# Patient Record
Sex: FEMALE | Race: WHITE | Employment: UNEMPLOYED | URBAN - METROPOLITAN AREA
[De-identification: names, ages, dates, MRNs, and addresses within clinical notes are randomized per-mention and may not be internally consistent; named-entity substitution may affect disease eponyms.]

---

## 2018-02-12 ENCOUNTER — HOSPITAL ENCOUNTER (EMERGENCY)
Age: 58
Discharge: HOME | End: 2018-02-12
Attending: EMERGENCY MEDICINE | Admitting: EMERGENCY MEDICINE
Payer: SUBSIDIZED

## 2018-02-12 ENCOUNTER — APPOINTMENT (OUTPATIENT)
Dept: GENERAL RADIOLOGY | Age: 58
End: 2018-02-12
Attending: EMERGENCY MEDICINE
Payer: SUBSIDIZED

## 2018-02-12 VITALS
OXYGEN SATURATION: 99 % | BODY MASS INDEX: 23.92 KG/M2 | DIASTOLIC BLOOD PRESSURE: 75 MMHG | HEIGHT: 62 IN | SYSTOLIC BLOOD PRESSURE: 113 MMHG | RESPIRATION RATE: 16 BRPM | TEMPERATURE: 98.4 F | WEIGHT: 130 LBS | HEART RATE: 84 BPM

## 2018-02-12 DIAGNOSIS — R07.9 CHEST PAIN, UNSPECIFIED TYPE: Primary | ICD-10-CM

## 2018-02-12 DIAGNOSIS — G89.29 CHRONIC NONINTRACTABLE HEADACHE, UNSPECIFIED HEADACHE TYPE: ICD-10-CM

## 2018-02-12 DIAGNOSIS — R51.9 CHRONIC NONINTRACTABLE HEADACHE, UNSPECIFIED HEADACHE TYPE: ICD-10-CM

## 2018-02-12 LAB
ALBUMIN SERPL-MCNC: 4.1 G/DL (ref 3.5–5)
ALBUMIN/GLOB SERPL: 1 {RATIO} (ref 1.1–2.2)
ALP SERPL-CCNC: 86 U/L (ref 45–117)
ALT SERPL-CCNC: 26 U/L (ref 12–78)
ANION GAP SERPL CALC-SCNC: 6 MMOL/L (ref 5–15)
AST SERPL-CCNC: 22 U/L (ref 15–37)
ATRIAL RATE: 77 BPM
BASOPHILS # BLD: 0 K/UL (ref 0–0.1)
BASOPHILS NFR BLD: 0 % (ref 0–1)
BILIRUB SERPL-MCNC: 0.2 MG/DL (ref 0.2–1)
BUN SERPL-MCNC: 13 MG/DL (ref 6–20)
BUN/CREAT SERPL: 22 (ref 12–20)
CALCIUM SERPL-MCNC: 8.9 MG/DL (ref 8.5–10.1)
CALCULATED P AXIS, ECG09: 51 DEGREES
CALCULATED R AXIS, ECG10: 60 DEGREES
CALCULATED T AXIS, ECG11: 60 DEGREES
CHLORIDE SERPL-SCNC: 105 MMOL/L (ref 97–108)
CO2 SERPL-SCNC: 29 MMOL/L (ref 21–32)
CREAT SERPL-MCNC: 0.6 MG/DL (ref 0.55–1.02)
DIAGNOSIS, 93000: NORMAL
DIFFERENTIAL METHOD BLD: NORMAL
EOSINOPHIL # BLD: 0 K/UL (ref 0–0.4)
EOSINOPHIL NFR BLD: 1 % (ref 0–7)
ERYTHROCYTE [DISTWIDTH] IN BLOOD BY AUTOMATED COUNT: 12.2 % (ref 11.5–14.5)
GLOBULIN SER CALC-MCNC: 4.1 G/DL (ref 2–4)
GLUCOSE SERPL-MCNC: 94 MG/DL (ref 65–100)
HCT VFR BLD AUTO: 37.3 % (ref 35–47)
HGB BLD-MCNC: 12.8 G/DL (ref 11.5–16)
IMM GRANULOCYTES # BLD: 0 K/UL (ref 0–0.04)
IMM GRANULOCYTES NFR BLD AUTO: 0 % (ref 0–0.5)
LYMPHOCYTES # BLD: 2 K/UL (ref 0.8–3.5)
LYMPHOCYTES NFR BLD: 43 % (ref 12–49)
MCH RBC QN AUTO: 30.5 PG (ref 26–34)
MCHC RBC AUTO-ENTMCNC: 34.3 G/DL (ref 30–36.5)
MCV RBC AUTO: 88.8 FL (ref 80–99)
MONOCYTES # BLD: 0.6 K/UL (ref 0–1)
MONOCYTES NFR BLD: 12 % (ref 5–13)
NEUTS SEG # BLD: 2 K/UL (ref 1.8–8)
NEUTS SEG NFR BLD: 44 % (ref 32–75)
NRBC # BLD: 0 K/UL (ref 0–0.01)
NRBC BLD-RTO: 0 PER 100 WBC
P-R INTERVAL, ECG05: 122 MS
PLATELET # BLD AUTO: 306 K/UL (ref 150–400)
PMV BLD AUTO: 9 FL (ref 8.9–12.9)
POTASSIUM SERPL-SCNC: 4.3 MMOL/L (ref 3.5–5.1)
PROT SERPL-MCNC: 8.2 G/DL (ref 6.4–8.2)
Q-T INTERVAL, ECG07: 372 MS
QRS DURATION, ECG06: 86 MS
QTC CALCULATION (BEZET), ECG08: 420 MS
RBC # BLD AUTO: 4.2 M/UL (ref 3.8–5.2)
SODIUM SERPL-SCNC: 140 MMOL/L (ref 136–145)
TROPONIN I SERPL-MCNC: <0.04 NG/ML
VENTRICULAR RATE, ECG03: 77 BPM
WBC # BLD AUTO: 4.7 K/UL (ref 3.6–11)

## 2018-02-12 PROCEDURE — 36415 COLL VENOUS BLD VENIPUNCTURE: CPT | Performed by: EMERGENCY MEDICINE

## 2018-02-12 PROCEDURE — 99283 EMERGENCY DEPT VISIT LOW MDM: CPT

## 2018-02-12 PROCEDURE — 71046 X-RAY EXAM CHEST 2 VIEWS: CPT

## 2018-02-12 PROCEDURE — 93005 ELECTROCARDIOGRAM TRACING: CPT

## 2018-02-12 PROCEDURE — 80053 COMPREHEN METABOLIC PANEL: CPT | Performed by: EMERGENCY MEDICINE

## 2018-02-12 PROCEDURE — 85025 COMPLETE CBC W/AUTO DIFF WBC: CPT | Performed by: EMERGENCY MEDICINE

## 2018-02-12 PROCEDURE — 84484 ASSAY OF TROPONIN QUANT: CPT | Performed by: EMERGENCY MEDICINE

## 2018-02-12 RX ORDER — LOVASTATIN 20 MG/1
20 TABLET ORAL
COMMUNITY
End: 2019-09-06

## 2018-02-12 RX ORDER — TRAZODONE HYDROCHLORIDE 50 MG/1
50 TABLET ORAL
COMMUNITY
End: 2019-09-06

## 2018-02-12 RX ORDER — DICLOFENAC SODIUM 50 MG/1
50 TABLET, DELAYED RELEASE ORAL 2 TIMES DAILY
Qty: 20 TAB | Refills: 0 | Status: SHIPPED | OUTPATIENT
Start: 2018-02-12 | End: 2019-12-06 | Stop reason: ALTCHOICE

## 2018-02-12 NOTE — ED PROVIDER NOTES
HPI Comments: 56 y.o. female with past medical history significant for high cholesterol who presents from home with chief complaint of CP. The pt reports that she has chronic R sided HA that is associated chronic intermittent R to mid CP that has persisted for years. The pt reports that she has the CP about 5 days ago and that it can last for days at a time. The pt also has R arm numbness with her CP. The pt has been taking Ibuprofen for pain. The pt had a fever 2 days ago. The pt started to have a cough one day ago. The pt has had a prior evaluation for her sx in her home country. The pt denies abdominal pain, SOB, and vomiting. There are no other acute medical concerns at this time. Social hx: nonsmoker, no EtOH use  PCP: None    Note written by Johnson Benitez, as dictated by Maria Alejandra Hobson MD 4:08 PM      The history is provided by the patient. A  was used (son). History reviewed. No pertinent past medical history. History reviewed. No pertinent surgical history. History reviewed. No pertinent family history. Social History     Social History    Marital status:      Spouse name: N/A    Number of children: N/A    Years of education: N/A     Occupational History    Not on file. Social History Main Topics    Smoking status: Never Smoker    Smokeless tobacco: Not on file    Alcohol use No    Drug use: Not on file    Sexual activity: Not on file     Other Topics Concern    Not on file     Social History Narrative    No narrative on file         ALLERGIES: Review of patient's allergies indicates no known allergies. Review of Systems   Constitutional: Positive for fever. Eyes: Negative for visual disturbance. Respiratory: Positive for cough. Negative for shortness of breath and wheezing. Cardiovascular: Positive for chest pain. Negative for leg swelling. Gastrointestinal: Negative for abdominal pain, diarrhea, nausea and vomiting. Genitourinary: Negative for dysuria. Musculoskeletal: Negative. Negative for back pain and neck stiffness. Skin: Negative for rash. Neurological: Positive for numbness and headaches. Negative for syncope. Psychiatric/Behavioral: Negative for confusion. All other systems reviewed and are negative. Vitals:    02/12/18 1451   BP: 118/76   Pulse: 76   Resp: 16   Temp: 98.4 °F (36.9 °C)   SpO2: 100%   Weight: 59 kg (130 lb)   Height: 5' 2\" (1.575 m)            Physical Exam   Constitutional: She appears well-developed and well-nourished. No distress. HENT:   Head: Normocephalic. Eyes: Pupils are equal, round, and reactive to light. Neck: Normal range of motion. Cardiovascular: Normal rate, regular rhythm and normal heart sounds. No murmur heard. Pulmonary/Chest: Effort normal and breath sounds normal. No respiratory distress. Occasional dry cough; Abdominal: Soft. There is no tenderness. Musculoskeletal: Normal range of motion. She exhibits no edema. Neurological: She is alert. She has normal strength. No cranial nerve deficit. Skin: Skin is warm and dry. Psychiatric: She has a normal mood and affect. Her behavior is normal.   Nursing note and vitals reviewed. Note written by Johnson Larios, as dictated by Mary Sanchez MD 4:09 PM      [facility]      ED Course       Procedures  ED EKG interpretation:  Rhythm: normal sinus rhythm; and regular . Rate (approx.): 77; Axis: normal; ST/T wave: normal;   Note written by Johnson Larios, as dictated by Mary Sanchez MD 4:09 PM    Discussed results c pt's son - who is . Advised to use voltaren prn and f./u c st. Marily [place] f.m. In 3 days if no better.

## 2018-02-13 NOTE — DISCHARGE INSTRUCTIONS
Dolor de pecho: Instrucciones de cuidado - [ Chest Pain: Care Instructions ]  Instrucciones de cuidado    El dolor de pecho puede tener muchas causas. Algunas no son graves y mejorarán por sí solas en pocos días. Rodney algunos tipos de dolor de pecho requieren más pruebas y [place]. Es posible que hinojosa médico le haya recomendado nickie visita de seguimiento dentro de las 8 a 12 horas siguientes. Si no mejora, es posible que necesite [address] pruebas o [place]. Aunque hinojosa médico le haya dado de dary, es necesario que esté atento a cualquier problema que se presente. El médico le hizo un cuidadoso chequeo, rodney a veces los problemas pueden aparecer posteriormente. Si tiene nuevos síntomas o éstos no mejoran, obtenga atención médica de inmediato. Si tiene dolor o presión en el pecho que empeora o es diferente y que dura más de 5 minutos, o se desmayó (perdió el conocimiento), llame al 911 o busque otra ayuda de emergencia de inmediato. Acudir a nickie consulta médica es sólo un paso en hinojosa tratamiento. Aunque se sienta mejor, todavía deberá hacer lo que hinojosa médico le recomiende, ralph asistir a todas las visitas de seguimiento sugeridas y yaquelin los medicamentos exactamente [employer]	[place] fueron indicados. [place] le ayudará a recuperarse y prevenir problemas futuros. ¿Cómo puede cuidarse en el hogar? · Descanse hasta que se sienta mejor. · Kerby arthur medicamentos exactamente ralph le fueron recetados. Llame a hinojosa médico si miguelito estar teniendo problemas con hinojosa medicamento. · No conduzca después de yaquelin un analgésico (medicamento para el dolor) recetado. ¿Cuándo debe pedir ayuda? Llame al 911 si:  ? · Se desmayó (perdió el conocimiento). ? · Tiene graves dificultades para respirar. ? · Tiene síntomas de un ataque al corazón. Estos podrían incluir:  ¨ Dolor o presión en el pecho, o nickie sensación extraña en el pecho. ¨ Sudoración. ¨ Falta de aire. ¨ Náuseas o vómito.   ¨ Dolor, presión o nickie sensación extraña en la espalda, el bacilio, la mandíbula, la parte superior del abdomen o en samy o ambos hombros o brazos. ¨ Aturdimiento o debilidad repentina. ¨ Latidos del corazón rápidos o irregulares. Después de llamar al 911, es posible que el operador le diga que mastique 1 aspirina para adultos o de 2 a 4 aspirinas de dosis baja. Espere a nickie ambulancia. No intente conducir usted mismo. ? Llame hoy a hinojosa médico si:  ? · Tiene cualquier dificultad para respirar. ? · El dolor en el pecho empeora. ? · [employer] o aturdimiento, o que está a punto de [place]. ? · No mejora ralph se esperaba. ? · Tiene dolor de pecho nuevo o diferente. ¿Dónde puede encontrar más información en inglés? [place] a [email or web address]://[email or web address].info/. Escriba A120 en la búsqueda para aprender más acerca de \"Dolor de pecho: Instrucciones de cuidado - [ Chest Pain: Care Instructions ]. \"  Revisado: 20 Marce Cr 2017  Versión del contenido: 11.4  © 6075-3453 Healthwise, Incorporated. Las instrucciones de cuidado fueron adaptadas bajo licencia por [employer] (which disclaims liability or warranty for this information). Si usted tiene [place] afección médica o sobre estas instrucciones, siempre pregunte a hinojosa profesional de danielle. Healthwise, Incorporated niega toda garantía o responsabilidad por hinjoosa uso de esta información. Dolor de rayo: Instrucciones de cuidado - [ Headache: Care Instructions ]  Instrucciones de cuidado    Los serafin de rayo tienen muchas causas posibles. La mayoría de los serafin de rayo no son señal de un problema más lola y mejoran por sí solos. El tratamiento en el hogar podría ayudarlo a sentirse mejor con [place]. El médico lo marquis revisado minuciosamente, yasir puede desarrollar problemas más tarde. Si nota algún problema o síntomas, busque tratamiento médico inmediatamente. La atención de seguimiento es nickie parte clave de hinojosa tratamiento y seguridad.  Asegúrese de hacer y acudir a todas las citas, y llame a hinojosa médico si está teniendo problemas. También es nickie buena idea saber los resultados de los exámenes y mantener nickie lista de los medicamentos que sherrie. ¿Cómo puede cuidarse en el hogar? · No conduzca si ha tomado analgésicos (medicamentos para el dolor) recetados. · Descanse en un cuarto tranquilo y oscuro hasta que desaparezca el dolor de [place]. Cierre los ojos y trate de relajarse o dormirse. No aissatou la televisión ni gerri. · Colóquese un paño frío y húmedo o [facility] compresa fría sobre la van adolorida de 10 a 21 minutos cada vez. Póngase un paño maldonado entre la compresa fría y la piel. · Utilice nickie toalla húmeda tibia o nickie almohadilla térmica ajustada a baja temperatura para relajar los músculos tensos del bacilio y los hombros.  · [place] a alguien que le isidro masajes suaves en el bacilio y los hombros.  · [place] los analgésicos exactamente ralph le fueron indicados. ¨ Si el médico le recetó un analgésico, tómelo según las indicaciones. ¨ Si no está tomando un analgésico recetado, pregúntele a hinojosa médico si puede yaquelin samy de [employer]. · Tenga cuidado de no yaquelin analgésicos con mayor frecuencia que la permitida en las indicaciones porque los serafin de rayo podrían empeorar o aparecer con mayor frecuencia nickie vez que el medicamento pierda hinojosa [place]. · Preste atención a los nuevos síntomas que aparecen con el dolor de [place], [place], debilidad o entumecimiento, cambios en la visión o confusión. Podrían ser señales de un problema más grave. Para prevenir los serafin de rayo  · [employer] un diario de arthur serafin de rayo para que pueda averiguar qué los desencadena. Evitar los desencadenantes podría ayudar a prevenir los serafin de [place]. Anote cuándo empieza cada dolor de [place], cuánto dura y cómo es el dolor (palpitante, le, punzante o sordo).  Anote cualquier otro síntoma que haya tenido con el dolor de [place], [place] náuseas, destellos de michael o MAANINKA oscuras, o sensibilidad a la michael brillante o a los ruidos filomena. Anote si el dolor de rayo ocurrió cerca de hinojosa menstruación. Enumere todos los factores que pudieran scott desencadenado el dolor de [place], ralph ciertos alimentos (chocolate, queso, vino) u olores, humo, luces brillantes, estrés o falta de sueño. · Encuentre maneras saludables de [facility]. Los serafin de [place] son más comunes heidy o des después de un momento estresante. Tómese un tiempo para relajarse antes y después de hacer algo que le haya causado un dolor de rayo en el pasado. · Trate de mantener chela músculos relajados mediante nickie buena postura. Revise si tiene [employer] de la Bhak[place], la lyndsey, el bacilio y los hombros y trate de relajarlos. Cuando se siente en un escritorio, cambie de posición con frecuencia y estírese por 27 segundos cada hora. · Aki suficiente ejercicio y duerma bastante. · Coma en forma regular y yoni. Largos períodos sin comer pueden provocar un dolor de rayo. · Regálese un masaje. Algunas personas encuentran que los masajes hechos con regularidad son [place] para aliviar la tensión. · Limite la cafeína. No karma demasiado café, té ni sodas. Rodney no deje de consumir cafeína de repente, porque eso también puede provocarle serafin de [place]. · Reduzca la tensión en los ojos a causa de la computadora parpadeando con frecuencia y apartando la mirada de la pantalla a menudo. Asegúrese de tener lentes adecuados y de que hinojosa monitor esté colocado de manera correcta, ralph a un brazo de distancia. · Busque ayuda si tiene depresión o ansiedad. Chela serafin de [place] podrían relacionarse con estas afecciones. El tratamiento puede prevenir los serafin de [place] y ayudar con los síntomas de ansiedad o depresión. ¿Cuándo debe pedir ayuda? Llame al 911 en cualquier momento que piense que puede necesitar atención de emergencia. Por ejemplo, llame si:  ?  · Tiene señales de un ataque cerebral. Estas pueden incluir:  ¨ Parálisis, entumecimiento o debilidad repentinos en la lyndsey, el brazo o la pierna, sobre todo si ocurre en un solo lado del cuerpo. ¨ Cambios repentinos en la visión. ¨ Dificultades repentinas para hablar. ¨ Confusión repentina o dificultad para comprender frases sencillas. ¨ Problemas repentinos para caminar o mantener el equilibrio. ¨ Dolor de [place] intenso y repentino, distinto de los serafin de rayo anteriores. ?Llame a hinojosa médico ahora mismo o busque atención médica inmediata si:  ? · Tiene un dolor de rayo nuevo o peor. ? · Hinojosa dolor de rayo [employer]. ¿Dónde puede encontrar más información en inglés? Renata Segundo a [email or web address]-[email or web address].info/. Escriba H942 en la búsqueda para aprender más acerca de \"Dolor de rayo: Instrucciones de cuidado - [ Headache: Care Instructions ]. \"  Revisado: 14 octubre, 2016  Versión del contenido: 11.4  © 2263-5786 Healthwise, Incorporated. Las instrucciones de cuidado fueron adaptadas bajo licencia por [employer] (which disclaims liability or warranty for this information). Si usted tiene [place] afección médica o sobre estas instrucciones, siempre pregunte a hinojosa profesional de danielle. Healthwise, Incorporated niega toda garantía o responsabilidad por hinojosa uso de esta información.

## 2019-02-21 ENCOUNTER — APPOINTMENT (OUTPATIENT)
Dept: GENERAL RADIOLOGY | Age: 59
End: 2019-02-21
Attending: EMERGENCY MEDICINE
Payer: SUBSIDIZED

## 2019-02-21 ENCOUNTER — APPOINTMENT (OUTPATIENT)
Dept: CT IMAGING | Age: 59
End: 2019-02-21
Attending: EMERGENCY MEDICINE
Payer: SUBSIDIZED

## 2019-02-21 ENCOUNTER — HOSPITAL ENCOUNTER (EMERGENCY)
Age: 59
Discharge: HOME HEALTH | End: 2019-02-21
Attending: EMERGENCY MEDICINE
Payer: SUBSIDIZED

## 2019-02-21 VITALS
BODY MASS INDEX: 24.29 KG/M2 | HEIGHT: 62 IN | TEMPERATURE: 98.7 F | RESPIRATION RATE: 21 BRPM | SYSTOLIC BLOOD PRESSURE: 120 MMHG | DIASTOLIC BLOOD PRESSURE: 69 MMHG | HEART RATE: 85 BPM | WEIGHT: 132 LBS | OXYGEN SATURATION: 99 %

## 2019-02-21 DIAGNOSIS — R20.2 PARESTHESIA: ICD-10-CM

## 2019-02-21 DIAGNOSIS — R51.9 ACUTE NONINTRACTABLE HEADACHE, UNSPECIFIED HEADACHE TYPE: Primary | ICD-10-CM

## 2019-02-21 DIAGNOSIS — R06.02 SOB (SHORTNESS OF BREATH): ICD-10-CM

## 2019-02-21 LAB
ALBUMIN SERPL-MCNC: 3.9 G/DL (ref 3.5–5)
ALBUMIN/GLOB SERPL: 0.9 {RATIO} (ref 1.1–2.2)
ALP SERPL-CCNC: 71 U/L (ref 45–117)
ALT SERPL-CCNC: 52 U/L (ref 12–78)
ANION GAP SERPL CALC-SCNC: 15 MMOL/L (ref 5–15)
AST SERPL-CCNC: 39 U/L (ref 15–37)
ATRIAL RATE: 90 BPM
BASOPHILS # BLD: 0 K/UL (ref 0–0.1)
BASOPHILS NFR BLD: 0 % (ref 0–1)
BILIRUB SERPL-MCNC: 0.5 MG/DL (ref 0.2–1)
BUN SERPL-MCNC: 18 MG/DL (ref 6–20)
BUN/CREAT SERPL: 30 (ref 12–20)
CALCIUM SERPL-MCNC: 9.7 MG/DL (ref 8.5–10.1)
CALCULATED P AXIS, ECG09: 52 DEGREES
CALCULATED R AXIS, ECG10: 33 DEGREES
CALCULATED T AXIS, ECG11: 72 DEGREES
CHLORIDE SERPL-SCNC: 103 MMOL/L (ref 97–108)
CO2 SERPL-SCNC: 22 MMOL/L (ref 21–32)
COMMENT, HOLDF: NORMAL
CREAT SERPL-MCNC: 0.61 MG/DL (ref 0.55–1.02)
D DIMER PPP FEU-MCNC: <0.19 MG/L FEU (ref 0–0.65)
DIAGNOSIS, 93000: NORMAL
DIFFERENTIAL METHOD BLD: ABNORMAL
EOSINOPHIL # BLD: 0.1 K/UL (ref 0–0.4)
EOSINOPHIL NFR BLD: 2 % (ref 0–7)
ERYTHROCYTE [DISTWIDTH] IN BLOOD BY AUTOMATED COUNT: 12.1 % (ref 11.5–14.5)
GLOBULIN SER CALC-MCNC: 4.2 G/DL (ref 2–4)
GLUCOSE SERPL-MCNC: 95 MG/DL (ref 65–100)
HCT VFR BLD AUTO: 37.8 % (ref 35–47)
HGB BLD-MCNC: 13 G/DL (ref 11.5–16)
IMM GRANULOCYTES # BLD AUTO: 0 K/UL (ref 0–0.04)
IMM GRANULOCYTES NFR BLD AUTO: 0 % (ref 0–0.5)
LYMPHOCYTES # BLD: 3.4 K/UL (ref 0.8–3.5)
LYMPHOCYTES NFR BLD: 48 % (ref 12–49)
MCH RBC QN AUTO: 30.2 PG (ref 26–34)
MCHC RBC AUTO-ENTMCNC: 34.4 G/DL (ref 30–36.5)
MCV RBC AUTO: 87.7 FL (ref 80–99)
MONOCYTES # BLD: 0.6 K/UL (ref 0–1)
MONOCYTES NFR BLD: 8 % (ref 5–13)
NEUTS SEG # BLD: 2.9 K/UL (ref 1.8–8)
NEUTS SEG NFR BLD: 41 % (ref 32–75)
NRBC # BLD: 0 K/UL (ref 0–0.01)
NRBC BLD-RTO: 0 PER 100 WBC
P-R INTERVAL, ECG05: 164 MS
PLATELET # BLD AUTO: 375 K/UL (ref 150–400)
PMV BLD AUTO: 8.8 FL (ref 8.9–12.9)
POTASSIUM SERPL-SCNC: 4.2 MMOL/L (ref 3.5–5.1)
PROT SERPL-MCNC: 8.1 G/DL (ref 6.4–8.2)
Q-T INTERVAL, ECG07: 368 MS
QRS DURATION, ECG06: 72 MS
QTC CALCULATION (BEZET), ECG08: 450 MS
RBC # BLD AUTO: 4.31 M/UL (ref 3.8–5.2)
SAMPLES BEING HELD,HOLD: NORMAL
SODIUM SERPL-SCNC: 140 MMOL/L (ref 136–145)
TROPONIN I SERPL-MCNC: <0.05 NG/ML
VENTRICULAR RATE, ECG03: 90 BPM
WBC # BLD AUTO: 7.1 K/UL (ref 3.6–11)

## 2019-02-21 PROCEDURE — 99284 EMERGENCY DEPT VISIT MOD MDM: CPT

## 2019-02-21 PROCEDURE — 85379 FIBRIN DEGRADATION QUANT: CPT

## 2019-02-21 PROCEDURE — 80053 COMPREHEN METABOLIC PANEL: CPT

## 2019-02-21 PROCEDURE — 96374 THER/PROPH/DIAG INJ IV PUSH: CPT

## 2019-02-21 PROCEDURE — 71046 X-RAY EXAM CHEST 2 VIEWS: CPT

## 2019-02-21 PROCEDURE — 84484 ASSAY OF TROPONIN QUANT: CPT

## 2019-02-21 PROCEDURE — 74011000250 HC RX REV CODE- 250: Performed by: EMERGENCY MEDICINE

## 2019-02-21 PROCEDURE — 96375 TX/PRO/DX INJ NEW DRUG ADDON: CPT

## 2019-02-21 PROCEDURE — 36415 COLL VENOUS BLD VENIPUNCTURE: CPT

## 2019-02-21 PROCEDURE — 74011250636 HC RX REV CODE- 250/636: Performed by: EMERGENCY MEDICINE

## 2019-02-21 PROCEDURE — 70450 CT HEAD/BRAIN W/O DYE: CPT

## 2019-02-21 PROCEDURE — 93005 ELECTROCARDIOGRAM TRACING: CPT

## 2019-02-21 PROCEDURE — 85025 COMPLETE CBC W/AUTO DIFF WBC: CPT

## 2019-02-21 RX ORDER — DIPHENHYDRAMINE HYDROCHLORIDE 50 MG/ML
25 INJECTION, SOLUTION INTRAMUSCULAR; INTRAVENOUS
Status: COMPLETED | OUTPATIENT
Start: 2019-02-21 | End: 2019-02-21

## 2019-02-21 RX ORDER — ONDANSETRON 4 MG/1
4 TABLET, ORALLY DISINTEGRATING ORAL
Qty: 12 TAB | Refills: 0 | Status: SHIPPED | OUTPATIENT
Start: 2019-02-21 | End: 2019-12-06 | Stop reason: ALTCHOICE

## 2019-02-21 RX ADMIN — DIPHENHYDRAMINE HYDROCHLORIDE 25 MG: 50 INJECTION, SOLUTION INTRAMUSCULAR; INTRAVENOUS at 05:31

## 2019-02-21 RX ADMIN — SODIUM CHLORIDE 10 MG: 9 INJECTION INTRAMUSCULAR; INTRAVENOUS; SUBCUTANEOUS at 05:31

## 2019-02-21 NOTE — DISCHARGE INSTRUCTIONS
Patient Education        Dolor de [place]: Instrucciones de cuidado - [ Headache: Care Instructions ]  Instrucciones de cuidado    Los serafin de rayo tienen muchas causas posibles. La mayoría de los serafin de rayo no son señal de un problema más lola y mejoran por sí solos. El tratamiento en el hogar podría ayudarlo a sentirse mejor con Rakan Heal. El médico lo marquis revisado minuciosamente, yasir puede desarrollar problemas más tarde. Si nota algún problema o síntomas, busque tratamiento médico inmediatamente. La atención de seguimiento es nickie parte clave de hinojosa tratamiento y seguridad. Asegúrese de hacer y acudir a todas las citas, y llame a hinojosa médico si está teniendo problemas. También es nickie buena idea saber los resultados de arthur exámenes y mantener nickie lista de los medicamentos que sherrie. ¿Cómo puede cuidarse en el hogar? · No conduzca si ha tomado analgésicos (medicamentos para el dolor) recetados. · Descanse en un cuarto tranquilo y oscuro hasta que desaparezca el dolor de [place]. Cierre los ojos y trate de relajarse o dormirse. No aissatou la televisión ni gerri. · Colóquese un paño frío y húmedo o [facility] compresa fría sobre la van adolorida de 10 a 21 minutos cada vez. Póngase un paño maldonado entre la compresa fría y la piel. · Utilice nickie toalla húmeda tibia o nickie almohadilla térmica ajustada a baja temperatura para relajar los músculos tensos del bacilio y los hombros.  · [place]dale a alguien que le isidro masajes suaves en el bacilio y los hombros.  · [place] los analgésicos exactamente ralph le fueron indicados. ? Si el médico le recetó un analgésico, tómelo según las indicaciones. ? Si no está tomando un analgésico recetado, pregúntele a hinojosa médico si puede yaquelin samy de [employer]. · Tenga cuidado de no yaquelin analgésicos con mayor frecuencia que la permitida en las indicaciones porque los serafin de rayo podrían empeorar o aparecer con mayor frecuencia nickie vez que el medicamento pierda hinojosa [place].   · Preste atención a los [employer] que aparecen con el dolor de [place], [place], debilidad o entumecimiento, cambios en la visión o confusión. Podrían ser señales de un problema más grave. Para prevenir los serafin de rayo  · [employer] un diario de arthur serafin de rayo para que pueda averiguar qué los desencadena. Evitar los desencadenantes podría ayudar a prevenir los serafin de [place]. Anote cuándo empieza cada dolor de [place], cuánto dura y cómo es el dolor (palpitante, le, punzante o sordo). Anote cualquier otro síntoma que haya tenido con el dolor de [place], [place] náuseas, destellos de michael o MARIAJOSE, o sensibilidad a la michael brillante o a los ruidos filomena. Anote si el dolor de rayo ocurrió cerca de hinojosa menstruación. Enumere todos los factores que pudieran scott desencadenado el dolor de [place], ralph ciertos alimentos (chocolate, queso, vino) u olores, humo, luces brillantes, estrés o falta de sueño. · Encuentre maneras saludables de [facility]. Los serafin de [place] son más comunes heidy o des después de un momento estresante. Tómese un tiempo para relajarse antes y después de hacer algo que le haya causado un dolor de rayo en el pasado. · Trate de mantener arthur músculos relajados mediante nickie buena postura. Revise si tiene [employer] de la Bhakti, la lyndsey, el bacilio y los hombros y trate de relajarlos. Cuando se siente en un escritorio, cambie de posición con frecuencia y estírese por 27 segundos cada hora. · Aki suficiente ejercicio y duerma bastante. · Coma en forma regular y yoni. Largos períodos sin comer pueden provocar un dolor de rayo. · Regálese un masaje. Algunas personas encuentran que los masajes hechos con regularidad son Mariellen Contras para aliviar la tensión. · Limite la cafeína. No karma demasiado café, té ni sodas. Rodney no deje de consumir cafeína de repente, porque eso también puede provocarle serafin de [place].   · Reduzca la tensión en los ojos a causa de la computadora parpadeando con frecuencia y apartando la mirada de la pantalla a menudo. Asegúrese de tener lentes adecuados y de que hinojosa monitor esté colocado de manera correcta, ralph a un brazo de distancia. · Busque ayuda si tiene depresión o ansiedad. Chela serafin de [place] podrían relacionarse con estas afecciones. El tratamiento puede prevenir los serafin de [place] y ayudar con los síntomas de ansiedad o depresión. ¿Cuándo debe pedir ayuda? Llame al 911 en cualquier momento que piense que puede necesitar atención de emergencia. Por ejemplo, llame si:    · Tiene señales de un ataque cerebral. Estas pueden incluir:  ? Parálisis, entumecimiento o debilidad repentinos en la lyndsey, el brazo o la pierna, sobre todo si ocurre en un solo lado del cuerpo. ? Cambios repentinos en la visión. ? Dificultades repentinas para hablar. ? Confusión repentina o dificultad para comprender frases sencillas. ? Problemas repentinos para caminar o mantener el equilibrio. ? Dolor de [place] intenso y repentino, distinto de los serafin de rayo anteriores.    Llame a hinojosa médico ahora mismo o busque atención médica inmediata si:    · Tiene un dolor de [place] Sumit o peor.     · Hinojosa dolor de rayo empeora mucho. ¿Dónde puede encontrar más información en inglés? Nadeem christopher http://jeison-cathy.info/. Escriba R698 en la búsqueda para aprender más acerca de \"Dolor de rayo: Instrucciones de cuidado - [ Headache: Care Instructions ]. \"  Revisado: 3 junio, 2018  Versión del contenido: 11.9  © 8848-6640 Healthwise, Incorporated. Las instrucciones de cuidado fueron adaptadas bajo licencia por Good Help Connections (which disclaims liability or warranty for this information). Si usted tiene [place] afección médica o sobre estas instrucciones, siempre pregunte a hinojosa profesional de danielle. Healthwise, Incorporated niega toda garantía o responsabilidad por hinojosa uso de esta información.          Patient Education        Entumecimiento y hormigueo: Instrucciones de cuidado - [ Numbness and Tingling: Care Instructions ]  Instrucciones de cuidado    Muchas cosas pueden causar entumecimiento u hormigueo. Nickie hinchazón puede ejercer presión [employer]. [place] podría causarle pérdida de sensación o que tenga nickie sensación de hormigueo en parte de hinojosa cuerpo. Los nervios pueden ser dañados por traumatismos, toxinas o enfermedades ralph diabetes o esclerosis múltiple (MS, por arthur siglas en [facility]). Sin embargo, algunas veces la causa no está cleo. Si no hay razón aparente para los síntomas, y no tiene ningún otro síntoma, hinojosa médico podría sugerir vigilar y esperar heidy un tiempo para román si el entumecimiento o el hormigueo desaparecen por sí solos. Es posible que hinojosa médico quiera que usted se isidro análisis de sharmaine o pruebas de los nervios para encontrar la causa de arthur síntomas. La atención de seguimiento es nickie parte clave de hinojosa tratamiento y seguridad. Asegúrese de hacer y acudir a todas las citas, y llame a hinojosa médico si está teniendo problemas. También es nickie buena idea saber los resultados de arthur exámenes y mantener nickie lista de los medicamentos que sherrie. ¿Cómo puede cuidarse en el hogar? · Si hinojosa médico le receta medicamentos, tómelos exactamente según las indicaciones. Llame a hinojosa médico si miguelito estar teniendo un problema con arthur medicamentos. · Si tiene alguna hinchazón, colóquese hielo o nickie compresa fría en la van de 10 a 20 minutos por vez. Póngase un paño maldonado entre el hielo y la piel. ¿Cuándo debe pedir ayuda? Llame al 911 en cualquier momento que considere que necesita atención de [place].  Por ejemplo, llame si:    · Tiene debilidad, entumecimiento u hormigueo en ambas piernas.     · Pierde el control de los intestinos o de la vejiga.     · Tiene síntomas de un ataque cerebral. Estos pueden incluir:  ? Entumecimiento, hormigueo, debilidad o parálisis repentinos en la lyndsey, el Marta [place] o la mickey, sobre todo si ocurre en un solo lado del cuerpo. ? Cambios súbitos en la vista. ? Problemas repentinos para hablar. ? Confusión súbita o dificultad repentina para comprender frases sencillas. ? Problemas repentinos para caminar o mantener el equilibrio. ? Un dolor de rayo intenso y repentino, distinto a los serafin de rayo anteriores.    Preste especial atención a los cambios en hinojosa danielle y asegúrese de comunicarse con hinojosa médico si tiene cualquier problema, o si:    · No mejora ralph se esperaba. ¿Dónde puede encontrar más información en inglés? Ivelisse Burrows a http://jeison-ctahy.info/. Annika Helms J488 en la búsqueda para aprender más acerca de \"Entumecimiento y hormigueo: Instrucciones de cuidado - [ Numbness and Tingling: Care Instructions ]. \"  Revisado: 3 rocky, 2018  Versión del contenido: 11.9  © 0158-0304 Healthwise, Incorporated. Las instrucciones de cuidado fueron adaptadas bajo licencia por Good Help Connections (which disclaims liability or warranty for this information). Si usted tiene [place] afección médica o sobre estas instrucciones, siempre pregunte a hinojosa profesional de danielle. Healthwise, Incorporated niega toda garantía o responsabilidad por hinojosa uso de esta información. Patient Education        Falta de aire: Instrucciones de cuidado - [ Shortness of Breath: Care Instructions ]  Instrucciones de cuidado  La falta de aire tiene muchas causas. A veces, las afecciones [address] [place] [address], ralph la ansiedad, pueden ocasionar falta de [place]. Algunas personas tienen nickie leve falta de aire cuando hacen ejercicio. Las dificultades para respirar también pueden ser síntoma de un problema grave, ralph asma, enfermedad de los pulmones, enfisema, problemas en el corazón y neumonía. Si continúa hinojosa falta de aire, es posible que necesite exámenes y tratamiento. Esté alerta a cualquier cambio en la respiración y otros síntomas.   [place] de seguimiento es nickie parte clave de hinojosa tratamiento y seguridad. Asegúrese de hacer y acudir a todas las citas, y llame a hinojosa médico si está teniendo problemas. También es nickie buena idea saber los resultados de arthur exámenes y mantener nickie lista de los medicamentos que sherrie. ¿Cómo puede cuidarse en el hogar? · No fume ni permita que otros fumen cerca de usted. Si necesita ayuda para dejar de fumar, hable con hinojosa médico [employer] y medicamentos para dejar de fumar. Éstos pueden aumentar arthur probabilidades de dejar el hábito para siempre. · Descanse y duerma lo suficiente. · [employer] medicamentos exactamente ralph le fueron recetados. Llame a hinojosa médico si miguelito que está teniendo un problema con hinojosa medicamento. · Encuentre maneras saludables de [facility]. ? [place] a diarainer. ? Duerma lo suficiente. ? Coma con regularidad y yoni. ¿Cuándo debe pedir ayuda? Llame al 911 en cualquier momento que considere que necesita atención de emergencia. Por ejemplo, llame si:    · Tiene nickie grave falta de aire.     · Tiene síntomas de un ataque al corazón. Estos podrían incluir:  ? Ronco Curt en el pecho, o nickie sensación extraña en el pecho.  ? Sudoración. ? Falta de aire. ? Náuseas o vómito. ? Dolor, presión o nickie sensación extraña en la espalda, el bacilio, la mandíbula, la parte superior del abdomen, o en samy o ambos hombros o brazos. ? Aturdimiento o debilidad repentina. ? Latidos cardíacos rápidos o irregulares. Cuando llame al 911, es posible que el operador le diga que mastique 1 aspirina para adultos o 2 a 4 aspirinas de dosis baja. Espere a la ambulancia. No trate de conducir usted mismo un automóvil.    Llame a hinojosa médico ahora mismo o busque atención médica inmediata si:    · La falta de aire empeora o Ardelle Rodrigo a tener respiración sibilante (con silbidos).  La respiración sibilante es un deangelo katelynn que se hace al respirar.     · Se despierta en la noche sin aire o necesita elevar hinojosa rayo sobre varias almohadas para poder respirar.     · Le falta el aire después de nickie actividad suave o cuando está descansando.    Preste especial atención a los cambios en hinojosa danielle y asegúrese de comunicarse con hinojosa médico si:    · No mejora en los siguientes 1 a 2 días. ¿Dónde puede encontrar más información en inglés? Esha Frank a http://jeison-cathy.info/. Sasha Servant S780 en la búsqueda para aprender más acerca de \"Falta de aire: Instrucciones de cuidado - [ Shortness of Breath: Care Instructions ]. \"  Revisado: 5 septiembre, 2018  Versión del contenido: 11.9  © 8202-3666 Healthwise, Incorporated. Las instrucciones de cuidado fueron adaptadas bajo licencia por Good Help Connections (which disclaims liability or warranty for this information). Si usted tiene [place] afección médica o sobre estas instrucciones, siempre pregunte a hinojosa profesional de danielle. Healthwise, Incorporated niega toda garantía o responsabilidad por hinojosa uso de esta información. [facility] Departments     For adult and child immunizations, family planning, TB screening, STD testing and women's health services. [facility]: [place] [phone]      [facility] 25   657 [facility]   [address]   170 [facility]: Paulette Zapata 200 [facility] [phone] 2400 [facility]          [address] [address]     For primary care services, woman and child wellness, and some clinics providing specialty care. [facility] -- [facility]. [address] [place] [address] [phone]   [address] [place] [address] [address] [place] [address] [phone]   [address] [place] [address] [address] [place] [address] [phone] 11878 [facility] [address] [place] [address]  [phone]   [address] [place] [address]  [address] [place] [phone]   [address] [phone]   [facility] [address], [address] [place] [address] [facility]: [facility] [address] 79 [facility] [address] [phone]     [place] [address] [phone]   [address] [phone]   [facility]  [address], [place] [address] ([email or web address]) [phone]         Sexual Health/Woman Wellness Clinics    For STD/HIV testing and treatment, pregnancy testing and services, men's health, birth control services, LGBT services, and hepatitis/HPV vaccine services. [employer] for [place] [employer] [address] [facility] [address] [facility] [address] [facility] 1579 [address] [phone]   [facility] [address], 5th floor [phone]   Pregnancy [address] [facility] [address]  [facility] [phone]         [place] [facility]67 [facility] [address] [place] [address]   [phone]   [place]   [phone]   Women, Infant and Children's Services: [place] [address] [phone]       600 [facility]   [phone]   [place] [address] [place] [address] Psychiatry     [phone]   [address] Crisis   1212 [facility] [phone]

## 2019-02-21 NOTE — ED PROVIDER NOTES
The patient presents to the ED with multiple complaints. Of note, she had Lasik surgery 3 weeks ago. She developed gradual onset of generalized HA 5 days ago. The headache has steadily . increased. [facility] and seen for same. She was [place] and [place] a shot. \" The symptoms continue. She does have hx HAs. She was last seen in the ED for a headache 15 years ago. She denies any visual changes. Her vision is much improved since the surgery. She denies any fever. She denies neck stiffness. She also feels that her body has been having tremors. She had some tingling her her L arm and both legs which was \"brief\" and is resolved. She also feels short of breath. She denies any cough. No recent travel. No personal or family hx PE/DVT. She has nausea, but no vomiting. She has no other complaints at this time. The history is provided by the patient and a relative. The history is limited by a language barrier. A  was used (nursing and patient's son). No past medical history on file. No past surgical history on file. No family history on file. Social History Socioeconomic History  Marital status:  Spouse name: Not on file  Number of children: Not on file  Years of education: Not on file  Highest education level: Not on file Social Needs  Financial resource strain: Not on file  Food insecurity - worry: Not on file  Food insecurity - inability: Not on file  Transportation needs - medical: Not on file  Transportation needs - non-medical: Not on file Occupational History  Not on file Tobacco Use  Smoking status: Never Smoker Substance and Sexual Activity  Alcohol use: No  
 Drug use: Not on file  Sexual activity: Not on file Other Topics Concern  Not on file Social History Narrative  Not on file ALLERGIES: Patient has no known allergies. Review of Systems Constitutional: Negative for appetite change and fever. HENT: Negative for congestion, nosebleeds and sore throat. Eyes: Negative for discharge and visual disturbance. Respiratory: Positive for shortness of breath. Negative for cough. Cardiovascular: Negative for chest pain. Gastrointestinal: Negative for abdominal pain, diarrhea, nausea and vomiting. Genitourinary: Negative for dysuria. Musculoskeletal: Negative. Skin: Negative for rash. Neurological: Positive for light-headedness, numbness and headaches. Negative for dizziness and weakness. Hematological: Negative for adenopathy. Psychiatric/Behavioral: Negative. All other systems reviewed and are negative. Vitals:  
 02/21/19 9754 02/21/19 4537 02/21/19 0602 02/21/19 1842 BP:  (!) 181/99 (!) 123/91 120/69 Pulse:  88 82 85 Resp:  23 21 Temp:  98.7 °F (37.1 °C) SpO2:  97% 98% 99% Weight: 59.9 kg (132 lb) Height: 5' 2\" (1.575 m) Physical Exam  
Constitutional: She appears well-developed and well-nourished. She appears distressed (anxious appearing.). HENT:  
Head: Normocephalic and atraumatic. Eyes: Conjunctivae and EOM are normal. Pupils are equal, round, and reactive to light. Neck: Normal range of motion. Neck supple. Cardiovascular: Normal rate, regular rhythm and normal heart sounds. Pulmonary/Chest: Effort normal and breath sounds normal.  
Tachypnea. Clear lungs. Abdominal: Soft. Bowel sounds are normal.  
Neurological: She is alert. No cranial nerve deficit or sensory deficit. She exhibits normal muscle tone. Coordination normal.  
Skin: Skin is warm and dry. Psychiatric: She has a normal mood and affect. Nursing note and vitals reviewed. MDM Procedures ED EKG interpretation: 
Rhythm: normal sinus rhythm; and regular .  Rate (approx.): 90; Axis: normal; P wave: normal; QRS interval: normal ; ST/T wave: normal; This EKG was interpreted by Desi Franks MD,ED Provider. A/P: 
1. Headache - hx headaches. Resolved. Excedrin prn. 
2. Nausea - zofran prn. 
3. SOB - resolved. Suspect related to hyperventilation. 4. Paresthesia - PTA. No neuro deficits on exam. 
 
 
Patient's results have been reviewed with them. Patient and/or family have verbally conveyed their understanding and agreement of the patient's signs, symptoms, diagnosis, treatment and prognosis and additionally agree to follow up as recommended or return to the Emergency Room should their condition change prior to follow-up. Discharge instructions have also been provided to the patient with some educational information regarding their diagnosis as well a list of reasons why they would want to return to the ER prior to their follow-up appointment should their condition change.

## 2019-08-29 ENCOUNTER — OFFICE VISIT (OUTPATIENT)
Dept: FAMILY MEDICINE CLINIC | Age: 59
End: 2019-08-29

## 2019-08-29 VITALS
DIASTOLIC BLOOD PRESSURE: 72 MMHG | HEART RATE: 72 BPM | BODY MASS INDEX: 24.11 KG/M2 | HEIGHT: 62 IN | OXYGEN SATURATION: 96 % | RESPIRATION RATE: 16 BRPM | TEMPERATURE: 98 F | WEIGHT: 131 LBS | SYSTOLIC BLOOD PRESSURE: 138 MMHG

## 2019-08-29 DIAGNOSIS — Z12.39 BREAST CANCER SCREENING: ICD-10-CM

## 2019-08-29 DIAGNOSIS — Z00.00 PHYSICAL EXAM: Primary | ICD-10-CM

## 2019-08-29 RX ORDER — ACETAMINOPHEN 325 MG/1
TABLET ORAL
COMMUNITY
End: 2019-12-06 | Stop reason: ALTCHOICE

## 2019-08-29 RX ORDER — GEMFIBROZIL 600 MG/1
600 TABLET, FILM COATED ORAL 2 TIMES DAILY
COMMUNITY
End: 2019-09-06

## 2019-08-29 NOTE — PROGRESS NOTES
Alonso Iyer is a 64 y.o. female   Chief Complaint   Patient presents with    New Patient    Establish Care    Complete Physical    Labs    pt here with a a close family friend. 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 Has been in the [address] [facility] for 3 years. Pt was seen in the ED for CP back in Feb and stroke and MI was ruled out. Friend states she has a lot of anxiety due to family members with care. Pt has 2 charts and these are being submitted for merger, other chart includes her middle intitial of H  Chief Complaint   Patient presents with    New Patient   [address]    Complete Physical    Labs     she is a 64y.o. year old female who presents for evalution. Reviewed PmHx, RxHx, FmHx, SocHx, AllgHx and updated and dated in the chart.     Review of Systems - negative except as listed above in the HPI    Objective:     Vitals:    08/29/19 1034   BP: 138/72   Pulse: 72   Resp: 16   Temp: 98 °F (36.7 °C)   TempSrc: Oral   SpO2: 96%   Weight: 131 lb (59.4 kg)   Height: 5' 1.5\" (1.562 m)     Physical Examination: General appearance - alert, well appearing, and in no distress  Mental status - alert, oriented to person, place, and time  Eyes - pupils equal and reactive, extraocular eye movements intact  Ears - bilateral TM's and external ear canals normal  Mouth - mucous membranes moist, pharynx normal without lesions  Neck - supple, no significant adenopathy  Lymphatics - no palpable lymphadenopathy, no hepatosplenomegaly  Chest - clear to auscultation, no wheezes, rales or rhonchi, symmetric air entry  Heart - normal rate, regular rhythm, normal S1, S2, no murmurs, rubs, clicks or gallops  Abdomen - soft, nontender, nondistended, no masses or organomegaly  Neurological - alert, oriented, normal speech, no focal findings or movement disorder noted  Musculoskeletal - no joint tenderness, deformity or swelling  Extremities - peripheral pulses normal, no pedal edema, no clubbing or cyanosis    Assessment/ Plan:   Diagnoses and all orders for this visit:    1. Physical exam  -     METABOLIC PANEL, COMPREHENSIVE  -     CBC WITH AUTOMATED DIFF  -     TSH 3RD GENERATION  -     LIPID PANEL  -     VITAMIN D, 25 HYDROXY  -     REFERRAL TO OBSTETRICS AND GYNECOLOGY    2. Breast cancer screening  -     [facility] PA[facility]E W MAMMO BI DX INCL CAD; Future     may switch to a statin from gemfibrozil, would use lovastatin for affordability  -Patient is in good health  -Discussed with patient cancer risk factors and screens needed  -Patient needs a colonoscopy no  -Labs from previous visits were discussed with patient yes  -Discussed with patient diet and exercise=yes  -Discussed with patient testicular (male)/breast self exam (female)= yes  Follow-up and Dispositions    · Return if symptoms worsen or fail to improve. I have discussed the diagnosis with the patient and the intended plan as seen in the above orders. The patient has received an after-visit summary and questions were answered concerning future plans. Pt conveyed understanding. Medication Side Effects and Warnings were discussed with patient: yes  Patient Labs were reviewed and or requested: yes  Patient Past Records were reviewed and or requested  yes    Patient Instructions        A Healthy Lifestyle: Care Instructions  Your Care Instructions    A healthy lifestyle can help you feel good, stay at a healthy weight, and have plenty of energy for both work and play. A healthy lifestyle is something you can share with your whole family. A healthy lifestyle also can lower your risk for serious health problems, such as high blood pressure, heart disease, and diabetes. You can follow a few steps listed below to improve your health and the health of your family. Follow-up care is a key part of your treatment and safety. Be sure to make and go to all appointments, and call your doctor if you are having problems.  It's also a good idea to know your test results and keep a list of the medicines you take. How can you care for yourself at home? · Do not eat too much sugar, fat, or fast foods. You can still have dessert and treats now and then. The goal is moderation. · Start small to improve your eating habits. Pay attention to portion sizes, drink less juice and soda pop, and eat more fruits and vegetables. ? Eat a healthy amount of food. A 3-ounce serving of meat, for example, is about the size of a deck of cards. Fill the rest of your plate with vegetables and whole grains. ? Limit the amount of soda and sports drinks you have every day. Drink more water when you are thirsty. ? Eat at least 5 servings of fruits and vegetables every day. It may seem like a lot, but it is not hard to reach this goal. A serving or helping is 1 piece of fruit, 1 cup of vegetables, or 2 cups of leafy, raw vegetables. Have an apple or some carrot sticks as an afternoon snack instead of a candy bar. Try to have fruits and/or vegetables at every meal.  · Make exercise part of your daily routine. You may want to start with simple activities, such as walking, bicycling, or slow swimming. Try to be active 30 to 60 minutes every day. You do not need to do all 30 to 60 minutes all at once. For example, you can exercise 3 times a day for 10 or 20 minutes. Moderate exercise is safe for most people, but it is always a good idea to talk to your doctor before starting an exercise program.  · Keep moving. Jovana Lechuga the lawn, work in the garden, or [place]. Take the stairs instead of the elevator at work. · If you smoke, quit. People who smoke have an increased risk for heart attack, stroke, cancer, and other lung illnesses. Quitting is hard, but there are ways to boost your chance of quitting tobacco for good. ? Use nicotine gum, patches, or lozenges. ? Ask your doctor about stop-smoking programs and medicines. ? Keep trying.   In addition to reducing your risk of diseases in the future, you will notice some benefits soon after you stop using tobacco. If you have shortness of breath or asthma symptoms, they will likely get better within a few weeks after you quit. · Limit how much alcohol you drink. Moderate amounts of alcohol (up to 2 drinks a day for men, 1 drink a day for women) are okay. But drinking too much can lead to liver problems, high blood pressure, and other health problems. Family health  If you have a family, there are many things you can do together to improve your health. · Eat meals together as a family as often as possible. · Eat healthy foods. This includes fruits, vegetables, lean meats and dairy, and whole grains. · Include your family in your fitness plan. Most people think of activities such as jogging or tennis as the way to fitness, but there are many ways you and your family can be more active. Anything that makes you breathe hard and gets your heart pumping is exercise. Here are some tips:  ? Walk to do errands or to take your child to school or the bus.  ? Go for a family bike ride after dinner instead of watching TV. Where can you learn more? Go to http://jeison-cathy.info/. Enter X109 in the search box to learn more about \"A Healthy Lifestyle: Care Instructions. \"  Current as of: September 11, 2018  Content Version: 12.1  © 3579-8826 Healthwise, Incorporated. Care instructions adapted under license by [employer] (which disclaims liability or warranty for this information). If you have questions about a medical condition or this instruction, always ask your healthcare professional. [place] [address] any warranty or liability for your use of this information.               Dr. Dwain Prakash

## 2019-08-29 NOTE — PATIENT INSTRUCTIONS
A Healthy Lifestyle: Care Instructions  Your Care Instructions    A healthy lifestyle can help you feel good, stay at a healthy weight, and have plenty of energy for both work and play. A healthy lifestyle is something you can share with your whole family. A healthy lifestyle also can lower your risk for serious health problems, such as high blood pressure, heart disease, and diabetes. You can follow a few steps listed below to improve your health and the health of your family. Follow-up care is a key part of your treatment and safety. Be sure to make and go to all appointments, and call your doctor if you are having problems. It's also a good idea to know your test results and keep a list of the medicines you take. How can you care for yourself at home? · Do not eat too much sugar, fat, or fast foods. You can still have dessert and treats now and then. The goal is moderation. · Start small to improve your eating habits. Pay attention to portion sizes, drink less juice and soda pop, and eat more fruits and vegetables. ? Eat a healthy amount of food. A 3-ounce serving of meat, for example, is about the size of a deck of cards. Fill the rest of your plate with vegetables and whole grains. ? Limit the amount of soda and sports drinks you have every day. Drink more water when you are thirsty. ? Eat at least 5 servings of fruits and vegetables every day. It may seem like a lot, but it is not hard to reach this goal. A serving or helping is 1 piece of fruit, 1 cup of vegetables, or 2 cups of leafy, raw vegetables. Have an apple or some carrot sticks as an afternoon snack instead of a candy bar. Try to have fruits and/or vegetables at every meal.  · Make exercise part of your daily routine. You may want to start with simple activities, such as walking, bicycling, or slow swimming. Try to be active 30 to 60 minutes every day. You do not need to do all 30 to 60 minutes all at once.  For example, you can exercise 3 times a day for 10 or 20 minutes. Moderate exercise is safe for most people, but it is always a good idea to talk to your doctor before starting an exercise program.  · Keep moving. Filemon Carrington the lawn, work in the garden, or [place]. Take the stairs instead of the elevator at work. · If you smoke, quit. People who smoke have an increased risk for heart attack, stroke, cancer, and other lung illnesses. Quitting is hard, but there are ways to boost your chance of quitting tobacco for good. ? Use nicotine gum, patches, or lozenges. ? Ask your doctor about stop-smoking programs and medicines. ? Keep trying. In addition to reducing your risk of diseases in the future, you will notice some benefits soon after you stop using tobacco. If you have shortness of breath or asthma symptoms, they will likely get better within a few weeks after you quit. · Limit how much alcohol you drink. Moderate amounts of alcohol (up to 2 drinks a day for men, 1 drink a day for women) are okay. But drinking too much can lead to liver problems, high blood pressure, and other health problems. Family health  If you have a family, there are many things you can do together to improve your health. · Eat meals together as a family as often as possible. · Eat healthy foods. This includes fruits, vegetables, lean meats and dairy, and whole grains. · Include your family in your fitness plan. Most people think of activities such as jogging or tennis as the way to fitness, but there are many ways you and your family can be more active. Anything that makes you breathe hard and gets your heart pumping is exercise. Here are some tips:  ? Walk to do errands or to take your child to school or the bus.  ? Go for a family bike ride after dinner instead of watching TV. Where can you learn more? Go to http://jeison-cathy.info/. Enter K489 in the search box to learn more about \"A Healthy Lifestyle: Care Instructions. \"  Current as of: September 11, 2018  Content Version: 12.1  © 8366-1892 Healthwise, Incorporated. Care instructions adapted under license by [employer] (which disclaims liability or warranty for this information). If you have questions about a medical condition or this instruction, always ask your healthcare professional. [address] any warranty or liability for your use of this information.

## 2019-08-29 NOTE — PROGRESS NOTES
Chief Complaint   Patient presents with    New Patient   [address] [facility] [address] Complete Physical    Labs     Patient presents in office today as a NP to establish care. Would like to get a CPE and labs. Patient is not fasting. Would like to get an order for a mammo and a referral to GYN. .  No other concerns.         Learning Assessment 8/29/2019   PRIMARY LEARNER Patient   PRIMARY LANGUAGE New Zealander   LEARNER PREFERENCE PRIMARY READING   ANSWERED BY self   RELATIONSHIP SELF

## 2019-08-31 LAB
25(OH)D3+25(OH)D2 SERPL-MCNC: 52.2 NG/ML (ref 30–100)
ALBUMIN SERPL-MCNC: 5 G/DL (ref 3.5–5.5)
ALBUMIN/GLOB SERPL: 1.9 {RATIO} (ref 1.2–2.2)
ALP SERPL-CCNC: 64 IU/L (ref 39–117)
ALT SERPL-CCNC: 13 IU/L (ref 0–32)
AST SERPL-CCNC: 16 IU/L (ref 0–40)
BASOPHILS # BLD AUTO: 0 X10E3/UL (ref 0–0.2)
BASOPHILS NFR BLD AUTO: 1 %
BILIRUB SERPL-MCNC: 0.6 MG/DL (ref 0–1.2)
BUN SERPL-MCNC: 13 MG/DL (ref 6–24)
BUN/CREAT SERPL: 22 (ref 9–23)
CALCIUM SERPL-MCNC: 9.5 MG/DL (ref 8.7–10.2)
CHLORIDE SERPL-SCNC: 103 MMOL/L (ref 96–106)
CHOLEST SERPL-MCNC: 201 MG/DL (ref 100–199)
CO2 SERPL-SCNC: 24 MMOL/L (ref 20–29)
CREAT SERPL-MCNC: 0.6 MG/DL (ref 0.57–1)
EOSINOPHIL # BLD AUTO: 0.1 X10E3/UL (ref 0–0.4)
EOSINOPHIL NFR BLD AUTO: 2 %
ERYTHROCYTE [DISTWIDTH] IN BLOOD BY AUTOMATED COUNT: 13 % (ref 12.3–15.4)
GLOBULIN SER CALC-MCNC: 2.7 G/DL (ref 1.5–4.5)
GLUCOSE SERPL-MCNC: 83 MG/DL (ref 65–99)
HCT VFR BLD AUTO: 35.2 % (ref 34–46.6)
HDLC SERPL-MCNC: 54 MG/DL
HGB BLD-MCNC: 12.2 G/DL (ref 11.1–15.9)
IMM GRANULOCYTES # BLD AUTO: 0 X10E3/UL (ref 0–0.1)
IMM GRANULOCYTES NFR BLD AUTO: 0 %
INTERPRETATION, 910389: NORMAL
LDLC SERPL CALC-MCNC: 133 MG/DL (ref 0–99)
LYMPHOCYTES # BLD AUTO: 2.4 X10E3/UL (ref 0.7–3.1)
LYMPHOCYTES NFR BLD AUTO: 49 %
MCH RBC QN AUTO: 30.3 PG (ref 26.6–33)
MCHC RBC AUTO-ENTMCNC: 34.7 G/DL (ref 31.5–35.7)
MCV RBC AUTO: 87 FL (ref 79–97)
MONOCYTES # BLD AUTO: 0.4 X10E3/UL (ref 0.1–0.9)
MONOCYTES NFR BLD AUTO: 8 %
NEUTROPHILS # BLD AUTO: 1.9 X10E3/UL (ref 1.4–7)
NEUTROPHILS NFR BLD AUTO: 40 %
PLATELET # BLD AUTO: 367 X10E3/UL (ref 150–450)
POTASSIUM SERPL-SCNC: 4.8 MMOL/L (ref 3.5–5.2)
PROT SERPL-MCNC: 7.7 G/DL (ref 6–8.5)
RBC # BLD AUTO: 4.03 X10E6/UL (ref 3.77–5.28)
SODIUM SERPL-SCNC: 143 MMOL/L (ref 134–144)
TRIGL SERPL-MCNC: 68 MG/DL (ref 0–149)
TSH SERPL DL<=0.005 MIU/L-ACNC: 1.86 UIU/ML (ref 0.45–4.5)
VLDLC SERPL CALC-MCNC: 14 MG/DL (ref 5–40)
WBC # BLD AUTO: 4.8 X10E3/UL (ref 3.4–10.8)

## 2019-09-06 ENCOUNTER — OFFICE VISIT (OUTPATIENT)
Dept: FAMILY MEDICINE CLINIC | Age: 59
End: 2019-09-06

## 2019-09-06 VITALS
WEIGHT: 129 LBS | OXYGEN SATURATION: 99 % | HEIGHT: 62 IN | TEMPERATURE: 97.9 F | SYSTOLIC BLOOD PRESSURE: 128 MMHG | HEART RATE: 70 BPM | BODY MASS INDEX: 23.74 KG/M2 | DIASTOLIC BLOOD PRESSURE: 58 MMHG | RESPIRATION RATE: 16 BRPM

## 2019-09-06 DIAGNOSIS — E78.2 MIXED HYPERLIPIDEMIA: ICD-10-CM

## 2019-09-06 DIAGNOSIS — F51.01 PRIMARY INSOMNIA: ICD-10-CM

## 2019-09-06 DIAGNOSIS — R13.10 PAINFUL SWALLOWING: ICD-10-CM

## 2019-09-06 DIAGNOSIS — H81.10 BENIGN PAROXYSMAL POSITIONAL VERTIGO, UNSPECIFIED LATERALITY: Primary | ICD-10-CM

## 2019-09-06 RX ORDER — MECLIZINE HYDROCHLORIDE 25 MG/1
25 TABLET ORAL
Qty: 30 TAB | Refills: 1 | Status: SHIPPED | OUTPATIENT
Start: 2019-09-06 | End: 2019-09-16

## 2019-09-06 RX ORDER — ATORVASTATIN CALCIUM 20 MG/1
20 TABLET, FILM COATED ORAL DAILY
Qty: 30 TAB | Refills: 5 | Status: SHIPPED | OUTPATIENT
Start: 2019-09-06 | End: 2019-12-06 | Stop reason: ALTCHOICE

## 2019-09-06 RX ORDER — AMITRIPTYLINE HYDROCHLORIDE 75 MG/1
75 TABLET, FILM COATED ORAL
Qty: 30 TAB | Refills: 5 | Status: SHIPPED | OUTPATIENT
Start: 2019-09-06 | End: 2019-12-06 | Stop reason: ALTCHOICE

## 2019-09-06 NOTE — PATIENT INSTRUCTIONS
Epley Maneuver at Home for Vertigo: Exercises  Introduction  Vertigo is a spinning or whirling sensation when you move your head. Your doctor may have moved you in different positions to help your vertigo get better faster. This is called the Epley maneuver. Your doctor also may have asked you to do these exercises at home. Do the exercises as often as your doctor recommends. If your vertigo is getting worse, your doctor may have you change the exercise or stop it. Step 1  Step 1    1. Sit on the edge of a bed or sofa. Step 2    1. Turn your head 45 degrees in the direction your doctor told you to. This should be toward the ear that causes the most vertigo for you. In this picture, the woman is turning toward her left ear. Step 3    1. Tilt yourself backward until you are lying on your back. Your head should still be at a 45-degree turn. Your head should be about midway between looking straight ahead and looking out to your side. Hold for 30 seconds. If you have vertigo, stay in this position until it stops. Step 4    1. Turn your head 90 degrees toward the ear that has the least vertigo. In this picture, the woman is turning to the right because she has vertigo on her left side. The point of your chin should be raised and over your shoulder. Hold for 30 seconds. Step 5    1. Roll onto the side with the least vertigo. You should now be looking at the floor. Hold for 30 seconds. Follow-up care is a key part of your treatment and safety. Be sure to make and go to all appointments, and call your doctor if you are having problems. It's also a good idea to know your test results and keep a list of the medicines you take. Where can you learn more? Go to http://jeison-cathy.info/. Enter 470 6076 in the search box to learn more about \"Epley Maneuver at Home for Vertigo: Exercises. \"  Current as of: March 28, 2019  Content Version: 12.1  © 5352-0197 Healthwise, [facility].  Care instructions adapted under license by [email or web address] (which disclaims liability or warranty for this information). If you have questions about a medical condition or this instruction, always ask your healthcare professional. [address] any warranty or liability for your use of this information.

## 2019-09-06 NOTE — PROGRESS NOTES
Chief Complaint   Patient presents with    Dizziness    Headache     Patient presents in office today with c/o feeling dizzy since Sunday. Also has c/o headaches. Treating with Tylenol with some relief. States that on Saturday her vision was blurry. No other concerns. 1. Have you been to the ER, urgent care clinic since your last visit? Hospitalized since your last visit? No    2. Have you seen or consulted any other health care providers outside of the [facility] since your last visit? Include any pap smears or colon screening.  No    Learning Assessment 8/29/2019   PRIMARY LEARNER Patient   PRIMARY LANGUAGE Georgian   LEARNER PREFERENCE PRIMARY READING   ANSWERED BY self   RELATIONSHIP SELF

## 2019-09-06 NOTE — LETTER
9/6/2019 2:45 PM 
 
Ms. Maria Fernanda Barrera 1 [facility] [address] Dear Maria Fernanda Barrera: 
 
Please find your most recent results below. Resulted Orders METABOLIC PANEL, COMPREHENSIVE (Collected: 8/29/2019 11:19 AM) Result Value Ref Range Glucose 83 65 - 99 mg/dL BUN 13 6 - 24 mg/dL Creatinine 0.60 0.57 - 1.00 mg/dL GFR est non- >59 mL/min/1.73 GFR est  >59 mL/min/1.73  
 BUN/Creatinine ratio 22 9 - 23 Sodium 143 134 - 144 mmol/L Potassium 4.8 3.5 - 5.2 mmol/L Chloride 103 96 - 106 mmol/L  
 CO2 24 20 - 29 mmol/L Calcium 9.5 8.7 - 10.2 mg/dL Protein, total 7.7 6.0 - 8.5 g/dL Albumin 5.0 3.5 - 5.5 g/dL GLOBULIN, TOTAL 2.7 1.5 - 4.5 g/dL A-G Ratio 1.9 1.2 - 2.2 Bilirubin, total 0.6 0.0 - 1.2 mg/dL Alk. phosphatase 64 39 - 117 IU/L  
 AST (SGOT) 16 0 - 40 IU/L  
 ALT (SGPT) 13 0 - 32 IU/L Narrative Performed at:  [address]  [id number] : Sanjiv Guzman MD, Phone:  [phone] CBC WITH AUTOMATED DIFF (Collected: 8/29/2019 11:19 AM) Result Value Ref Range WBC 4.8 3.4 - 10.8 x10E3/uL  
 RBC 4.03 3.77 - 5.28 x10E6/uL HGB 12.2 11.1 - 15.9 g/dL HCT 35.2 34.0 - 46.6 % MCV 87 79 - 97 fL  
 MCH 30.3 26.6 - 33.0 pg  
 MCHC 34.7 31.5 - 35.7 g/dL  
 RDW 13.0 12.3 - 15.4 % PLATELET 823 384 - 840 x10E3/uL NEUTROPHILS 40 Not Estab. % Lymphocytes 49 Not Estab. % MONOCYTES 8 Not Estab. % EOSINOPHILS 2 Not Estab. % BASOPHILS 1 Not Estab. %  
 ABS. NEUTROPHILS 1.9 1.4 - 7.0 x10E3/uL Abs Lymphocytes 2.4 0.7 - 3.1 x10E3/uL  
 ABS. MONOCYTES 0.4 0.1 - 0.9 x10E3/uL  
 ABS. EOSINOPHILS 0.1 0.0 - 0.4 x10E3/uL  
 ABS. BASOPHILS 0.0 0.0 - 0.2 x10E3/uL IMMATURE GRANULOCYTES 0 Not Estab. %  
 ABS. IMM. GRANS. 0.0 0.0 - 0.1 x10E3/uL Narrative Performed at:  [address]  [id number] : Harish Olivera MD, Phone:  [phone] TSH 3RD GENERATION (Collected: 8/29/2019 11:19 AM) Result Value Ref Range TSH 1.860 0.450 - 4.500 uIU/mL Narrative Performed at:  [address]  [id number] : Harish Olivera MD, Phone:  [phone] LIPID PANEL (Collected: 8/29/2019 11:19 AM) Result Value Ref Range Cholesterol, total 201 (H) 100 - 199 mg/dL Triglyceride 68 0 - 149 mg/dL HDL Cholesterol 54 >39 mg/dL VLDL, calculated 14 5 - 40 mg/dL LDL, calculated 133 (H) 0 - 99 mg/dL Narrative Performed at:  [address]  [id number] : Harish Olivera MD, Phone:  [phone] VITAMIN D, 25 HYDROXY (Collected: 8/29/2019 11:19 AM) Result Value Ref Range VITAMIN D, 25-HYDROXY 52.2 30.0 - 100.0 ng/mL Comment:  
   Vitamin D deficiency has been defined by the [address] [facility] practice guideline as a 
level of serum 25-OH vitamin D less than 20 ng/mL (1,2). The Endocrine Society went on to further define vitamin D 
insufficiency as a level between 21 and 29 ng/mL (2). 1. IOM ([place] [employer]). 2010. Dietary reference 
   intakes for calcium and D. [facility] 
   [employer]. 2. Tea MF, Thuan NC, Pascale ALLEN, et al. 
   Evaluation, treatment, and prevention of vitamin D 
   deficiency: an Endocrine Society clinical practice 
   guideline. JCEM. 2011 Jul; 96(7):1911-30. Narrative Performed at:  [address]  [id number] : Harish Olivera MD, Phone:  [phone] CVD REPORT (Collected: 8/29/2019 11:19 AM) Result Value Ref Range INTERPRETATION Note Comment:  
   Supplemental report is available. Narrative Performed at:  [address] [place] [address]  [id number] : Tash Masters MD, Phone:  [phone] RECOMMENDATIONS: 
 
Slight elevation of LDL cholesterol otherwise labs look fine.  Work on diet recheck labs in a year Please call me if you have any questions: [phone] Sincerely, [address] [facility] [address], DO

## 2019-09-06 NOTE — PROGRESS NOTES
Annabelle Fernandez is a 64 y.o. female   Chief Complaint   Patient presents with    Dizziness    Headache    Pt here with daughter and states that she had went to get up from her bed and she started to get room spinning. Changes in position cause the spinning to come on as well. Has not taken anything for this. Never had symptoms before. Pt also reports she gets some pain with swallowing in her neck, no globus sensation tho. States she had a biopsy for a throat issue years ago and this was benign. Pt also having ahard time sleeping despite elavil 50 will increase to 75    Chol was still elevated on gemfibrozil will change to lipitor    she is a 64y.o. year old female who presents for evalution. Reviewed PmHx, RxHx, FmHx, SocHx, AllgHx and updated and dated in the chart. Review of Systems - negative except as listed above in the HPI    Objective:     Vitals:    09/06/19 1438   BP: 128/58   Pulse: 70   Resp: 16   Temp: 97.9 °F (36.6 °C)   TempSrc: Oral   SpO2: 99%   Weight: 129 lb (58.5 kg)   Height: 5' 1.5\" (1.562 m)       Current Outpatient Medications   Medication Sig    atorvastatin (LIPITOR) 20 mg tablet Take 1 Tab by mouth daily.  amitriptyline (ELAVIL) 75 mg tablet Take 1 Tab by mouth nightly.  meclizine (ANTIVERT) 25 mg tablet Take 1 Tab by mouth three (3) times daily as needed for Dizziness for up to 10 days.  acetaminophen (TYLENOL) 325 mg tablet Take  by mouth every four (4) hours as needed for Pain.  GARLIC EXTRACT PO Take  by mouth.  ondansetron (ZOFRAN ODT) 4 mg disintegrating tablet Take 1 Tab by mouth every eight (8) hours as needed for Nausea.  diclofenac EC (VOLTAREN) 50 mg EC tablet Take 1 Tab by mouth two (2) times a day. No current facility-administered medications for this visit.         Physical Examination: General appearance - alert, well appearing, and in no distress  Mental status - alert, oriented to person, place, and time  Eyes - pupils equal and reactive, extraocular eye movements intact  Ears - bilateral TM's and external ear canals normal  Mouth - mucous membranes moist, pharynx normal without lesions  Neck - supple, no significant adenopathy  Chest - clear to auscultation, no wheezes, rales or rhonchi, symmetric air entry  Heart - normal rate, regular rhythm, normal S1, S2, no murmurs, rubs, clicks or gallops    Neuro, pos barbara hallpike  Assessment/ Plan:   Diagnoses and all orders for this visit:    1. Benign paroxysmal positional vertigo, unspecified laterality  -     meclizine (ANTIVERT) 25 mg tablet; Take 1 Tab by mouth three (3) times daily as needed for Dizziness for up to 10 days. Gave daughter info on epley maneuver  2. Mixed hyperlipidemia  -     atorvastatin (LIPITOR) 20 mg tablet; Take 1 Tab by mouth daily. Stop gemfibrozil start lipitor  3. Painful swallowing  -     REFERRAL TO GASTROENTEROLOGY    4. Primary insomnia  -     amitriptyline (ELAVIL) 75 mg tablet; Take 1 Tab by mouth nightly. Follow-up and Dispositions    · Return in about 3 months (around 12/6/2019), or if symptoms worsen or fail to improve. I have discussed the diagnosis with the patient and the intended plan as seen in the above orders. The patient has received an after-visit summary and questions were answered concerning future plans. Pt conveyed understanding of plan.     Medication Side Effects and Warnings were discussed with patient      [address] [facility], DO

## 2019-09-09 ENCOUNTER — DOCUMENTATION ONLY (OUTPATIENT)
Dept: FAMILY MEDICINE CLINIC | Age: 59
End: 2019-09-09

## 2019-09-20 ENCOUNTER — OFFICE VISIT (OUTPATIENT)
Dept: OBGYN CLINIC | Age: 59
End: 2019-09-20

## 2019-09-20 VITALS
BODY MASS INDEX: 23.92 KG/M2 | HEIGHT: 62 IN | WEIGHT: 130 LBS | DIASTOLIC BLOOD PRESSURE: 78 MMHG | SYSTOLIC BLOOD PRESSURE: 124 MMHG

## 2019-09-20 DIAGNOSIS — Z01.419 WELL FEMALE EXAM WITH ROUTINE GYNECOLOGICAL EXAM: Primary | ICD-10-CM

## 2019-09-20 DIAGNOSIS — Z01.419 ENCOUNTER FOR GYNECOLOGICAL EXAMINATION WITHOUT ABNORMAL FINDING: ICD-10-CM

## 2019-09-20 NOTE — PROGRESS NOTES
Annual exam ages 40-58      Erasto Perez is a No obstetric history on file. ,  64 y.o. female   No LMP recorded. (Menstrual status: Menopause). She presents for her annual checkup. She is having no significant problems. Menstrual status:    Her periods are absent due to menopause. Contraception:    The current method of family planning is NA post menopause. Hormonal status:  She reports no perimenstrual type symptoms. She is not having vasomotor symptoms. The patient is not using any ERT. Sexual history:    She  reports that she does not currently engage in sexual activity. Medical conditions:    Since her last annual GYN exam about two years ago, she has not the following changes in her health history: none. Surgical history confirmed with patient. has a past surgical history that includes hx orthopaedic. Pap and Mammogram History:    Her most recent Pap smear was normal per pt, obtained 2 year(s) ago. The patient has not had a recent mammogram.    Breast Cancer History/Substance Abuse: negative      Osteoporosis History:    Family history does not include a first or second degree relative with osteopenia or osteoporosis. Past Medical History:   Diagnosis Date    Headache     Hypercholesterolemia      Past Surgical History:   Procedure Laterality Date    HX ORTHOPAEDIC         Current Outpatient Medications   Medication Sig Dispense Refill    atorvastatin (LIPITOR) 20 mg tablet Take 1 Tab by mouth daily. 30 Tab 5    amitriptyline (ELAVIL) 75 mg tablet Take 1 Tab by mouth nightly. 30 Tab 5    acetaminophen (TYLENOL) 325 mg tablet Take  by mouth every four (4) hours as needed for Pain.  GARLIC EXTRACT PO Take  by mouth.  ondansetron (ZOFRAN ODT) 4 mg disintegrating tablet Take 1 Tab by mouth every eight (8) hours as needed for Nausea. 12 Tab 0    diclofenac EC (VOLTAREN) 50 mg EC tablet Take 1 Tab by mouth two (2) times a day.  20 Tab 0     Allergies: Patient has no known allergies. Tobacco History:  reports that she has never smoked. She has never used smokeless tobacco.  Alcohol Abuse:  reports that she does not drink alcohol. Drug Abuse:  reports that she does not use drugs. Family Medical/Cancer History:   Family History   Problem Relation Age of Onset    Ovarian Cancer Mother     Stroke Father     Cancer Paternal Aunt     Cancer Paternal Uncle         Review of Systems - History obtained from the patient  Constitutional: negative for weight loss, fever, night sweats  HEENT: negative for hearing loss, earache, congestion, snoring, sorethroat  CV: negative for chest pain, palpitations, edema  Resp: negative for cough, shortness of breath, wheezing  GI: negative for change in bowel habits, abdominal pain, black or bloody stools  : negative for frequency, dysuria, hematuria, vaginal discharge  MSK: negative for back pain, joint pain, muscle pain  Breast: negative for breast lumps, nipple discharge, galactorrhea  Skin :negative for itching, rash, hives  Neuro: negative for dizziness, headache, confusion, weakness  Psych: negative for anxiety, depression, change in mood  Heme/lymph: negative for bleeding, bruising, pallor    Physical Exam    There were no vitals taken for this visit.     Constitutional  · Appearance: well-nourished, well developed, alert, in no acute distress    HENT  · Head and Face: appears normal    Neck  · Inspection/Palpation: normal appearance, no masses or tenderness  · Lymph Nodes: no lymphadenopathy present  · Thyroid: gland size normal, nontender, no nodules or masses present on palpation    Chest  · Respiratory Effort: breathing unlabored    Breasts  · Inspection of Breasts: breasts symmetrical, no skin changes, no discharge present, nipple appearance normal, no skin retraction present  · Palpation of Breasts and Axillae: no masses present on palpation, no breast tenderness  · Axillary Lymph Nodes: no lymphadenopathy present    Gastrointestinal  · Abdominal Examination: abdomen non-tender to palpation, normal bowel sounds, no masses present  · Liver and spleen: no hepatomegaly present, spleen not palpable  · Hernias: no hernias identified    Genitourinary  · External Genitalia: normal appearance for age, no discharge present, no tenderness present, no inflammatory lesions present, no masses present, no atrophy present  · Vagina: normal vaginal vault without central or paravaginal defects, no discharge present, no inflammatory lesions present, no masses present  · Bladder: non-tender to palpation  · Urethra: appears normal  · Cervix: normal   · Uterus: normal size, shape and consistency  · Adnexa: no adnexal tenderness present, no adnexal masses present  · Perineum: perineum within normal limits, no evidence of trauma, no rashes or skin lesions present  · Anus: anus within normal limits, no hemorrhoids present  · Inguinal Lymph Nodes: no lymphadenopathy present    Skin  · General Inspection: no rash, no lesions identified    Neurologic/Psychiatric  · Mental Status:  · Orientation: grossly oriented to person, place and time  · Mood and Affect: mood normal, affect appropriate    Assessment:  Routine gynecologic examination  Her current medical status is satisfactory with no evidence of significant gynecologic issues.     Plan:  Counseled re: diet, exercise, healthy lifestyle  Return for yearly wellness visits  Rec annual mammogram

## 2019-09-20 NOTE — PATIENT INSTRUCTIONS

## 2019-09-25 LAB
CYTOLOGIST CVX/VAG CYTO: NORMAL
CYTOLOGY CVX/VAG DOC CYTO: NORMAL
CYTOLOGY CVX/VAG DOC THIN PREP: NORMAL
DX ICD CODE: NORMAL
HPV I/H RISK 1 DNA CVX QL PROBE+SIG AMP: NEGATIVE
Lab: NORMAL
OTHER STN SPEC: NORMAL
STAT OF ADQ CVX/VAG CYTO-IMP: NORMAL

## 2019-10-22 ENCOUNTER — DOCUMENTATION ONLY (OUTPATIENT)
Dept: FAMILY MEDICINE CLINIC | Age: 59
End: 2019-10-22

## 2019-10-22 NOTE — PROGRESS NOTES
Faxed 09/06/19 office note & 08/29/19 office note/lab results to Dr Bertha Fowler per [facility] request. Fax #[phone] confirmation received.

## 2019-11-20 ENCOUNTER — TELEPHONE (OUTPATIENT)
Dept: FAMILY MEDICINE CLINIC | Age: 59
End: 2019-11-20

## 2019-11-20 RX ORDER — MECLIZINE HYDROCHLORIDE 25 MG/1
25 TABLET ORAL
Qty: 30 TAB | Refills: 0 | Status: SHIPPED | OUTPATIENT
Start: 2019-11-20 | End: 2019-11-25 | Stop reason: SDUPTHER

## 2019-11-20 NOTE — TELEPHONE ENCOUNTER
Pt's niece called asking for refill on meclizine for vertigo. Pt has been dizzy and vomiting all morning. Appt was scheduled yesterday for 11.25.19 for sick visit.

## 2019-11-25 ENCOUNTER — OFFICE VISIT (OUTPATIENT)
Dept: FAMILY MEDICINE CLINIC | Age: 59
End: 2019-11-25

## 2019-11-25 VITALS
HEART RATE: 67 BPM | HEIGHT: 62 IN | WEIGHT: 129.19 LBS | OXYGEN SATURATION: 100 % | BODY MASS INDEX: 23.77 KG/M2 | SYSTOLIC BLOOD PRESSURE: 149 MMHG | DIASTOLIC BLOOD PRESSURE: 78 MMHG | RESPIRATION RATE: 18 BRPM | TEMPERATURE: 97.5 F

## 2019-11-25 DIAGNOSIS — K21.9 GASTROESOPHAGEAL REFLUX DISEASE, ESOPHAGITIS PRESENCE NOT SPECIFIED: ICD-10-CM

## 2019-11-25 DIAGNOSIS — H81.10 BENIGN PAROXYSMAL POSITIONAL VERTIGO, UNSPECIFIED LATERALITY: Primary | ICD-10-CM

## 2019-11-25 RX ORDER — MECLIZINE HYDROCHLORIDE 25 MG/1
25 TABLET ORAL
Qty: 30 TAB | Refills: 0 | Status: SHIPPED | OUTPATIENT
Start: 2019-11-25 | End: 2019-12-05

## 2019-11-25 RX ORDER — PANTOPRAZOLE SODIUM 20 MG/1
20 TABLET, DELAYED RELEASE ORAL DAILY
Qty: 30 TAB | Refills: 5 | Status: SHIPPED | OUTPATIENT
Start: 2019-11-25 | End: 2019-12-06 | Stop reason: ALTCHOICE

## 2019-11-25 NOTE — PROGRESS NOTES
Chief Complaint   Patient presents with    Dizziness     X 1 wk-  dizzy &  vomiting      Sergio Grande is a 64 y.o. female who presents for evaluation. Here for dizziness for the past week and vomiting. States that dizziness started last Saturday, then developed nausea and vomiting Sunday. Was vomiting until this Sunday, for approx 1 week. Was here for the same last Sept and was given meclizine. States they tried to get refill of this and was told could get over the counter, purchased and restarted recently and it has been helping. States when moves head to side it brings on dizziness, worse with moving head to the left compared to right. Describes sensation as room spinning around. No lightheadedness or syncope. Also with diffuse upper abdominal pain s/p vomiting. Rates 8/10 at worst but pain is intermittent. Pain better after eating or water. Pain worse around midnight. States it is primarily upper abdomen affected. Not taking anything of acid reflux. States she has appt for endoscopy in a couple months. Reviewed PmHx, RxHx, FmHx, SocHx, AllgHx and updated and dated in the chart. There are no active problems to display for this patient.       Review of Systems - negative except as listed above in the HPI    Objective:     Vitals:    11/25/19 1334   BP: 149/78   Pulse: 67   Resp: 18   Temp: 97.5 °F (36.4 °C)   TempSrc: Oral   SpO2: 100%   Weight: 129 lb 3 oz (58.6 kg)   Height: 5' 1.5\" (1.562 m)     Physical Examination: General appearance - alert, well appearing, and in no distress  Mental status - alert, oriented to person, place, and time  Eyes - pupils equal and reactive, extraocular eye movements intact  Ears - left ear normal, ceruminosis noted right ear  Mouth - mucous membranes moist, pharynx normal without lesions  Neck - supple, no significant adenopathy  Chest - clear to auscultation, no wheezes, rales or rhonchi, symmetric air entry  Heart - normal rate, regular rhythm, normal S1, S2, no murmurs, rubs, clicks or gallops  Abdomen - soft, nontender, nondistended, no masses or organomegaly  bowel sounds normal  no CVA tenderness  Extremities - peripheral pulses normal, no pedal edema, no clubbing or cyanosis    Assessment/ Plan:   Diagnoses and all orders for this visit:    1. Benign paroxysmal positional vertigo, unspecified laterality  -     meclizine (ANTIVERT) 25 mg tablet; Take 1 Tab by mouth three (3) times daily as needed for Dizziness for up to 10 days.  -     REFERRAL TO PHYSICAL THERAPY  - Discussed nature of dx and cause of dizziness, pt has been doing exercizes at home but states she cannot tolerate d/t nausea  - Discussed that PT is able to help with this. Given information for [facility] but not sure if they will take insurance. Also given info for [facility] but unsure if they do BPPV therapy. Advised to call and see. - Discussed importance of keeping ears clear of cerumen as well, advised to use OTC product. 2. Gastroesophageal reflux disease, esophagitis presence not specified  -     pantoprazole (PROTONIX) 20 mg tablet; Take 1 Tab by mouth daily.  - New Rx, advised on use and SE/ADRs. Advised to take on empty stomach  - Discussed pain likely r/t gastritis from vomiting  - F/U if no improvement       Follow-up and Dispositions    · Return if symptoms worsen or fail to improve. I have discussed the diagnosis with the patient and the intended plan as seen in the above orders. The patient understands and agrees with the plan. The patient has received an after-visit summary and questions were answered concerning future plans. Medication Side Effects and Warnings were discussed with patient  Patient Labs were reviewed and or requested:  Patient Past Records were reviewed and or requested    Donta Wood NP  [address] [facility]    There are no Patient Instructions on file for this visit.

## 2019-11-25 NOTE — PROGRESS NOTES
1. Have you been to the ER, urgent care clinic since your last visit? Hospitalized since your last visit? No    2. Have you seen or consulted any other health care providers outside of the [address] [place] [address] since your last visit? Include any pap smears or colon screening.  No   Chief Complaint   Patient presents with    Dizziness     X 1 wk-  dizzy &  vomiting

## 2019-12-06 ENCOUNTER — HOSPITAL ENCOUNTER (OUTPATIENT)
Dept: LAB | Age: 59
Discharge: HOME | End: 2019-12-06

## 2019-12-06 ENCOUNTER — OFFICE VISIT (OUTPATIENT)
Dept: FAMILY MEDICINE CLINIC | Age: 59
End: 2019-12-06

## 2019-12-06 VITALS
TEMPERATURE: 97.9 F | DIASTOLIC BLOOD PRESSURE: 65 MMHG | WEIGHT: 130 LBS | RESPIRATION RATE: 14 BRPM | HEIGHT: 62 IN | BODY MASS INDEX: 23.92 KG/M2 | OXYGEN SATURATION: 98 % | SYSTOLIC BLOOD PRESSURE: 114 MMHG | HEART RATE: 68 BPM

## 2019-12-06 DIAGNOSIS — E78.2 MIXED HYPERLIPIDEMIA: ICD-10-CM

## 2019-12-06 DIAGNOSIS — E78.2 MIXED HYPERLIPIDEMIA: Primary | ICD-10-CM

## 2019-12-06 LAB
CHOLEST SERPL-MCNC: 125 MG/DL
HDLC SERPL-MCNC: 60 MG/DL
HDLC SERPL: 2.1 {RATIO} (ref 0–5)
LDLC SERPL CALC-MCNC: 53.6 MG/DL (ref 0–100)
LIPID PROFILE,FLP: NORMAL
TRIGL SERPL-MCNC: 57 MG/DL (ref ?–150)
VLDLC SERPL CALC-MCNC: 11.4 MG/DL

## 2019-12-06 NOTE — PROGRESS NOTES
HISTORY OF PRESENT ILLNESS  Callie Amos is a 64 y.o. female. HPI  Patient is here for follow up chol labs  Changed to lipitor last visit  No adr/se of meds  Visit done through  (family)    ROS  A comprehensive review of system was obtained and negative except findings in the HPI    Visit Vitals  /65 (BP 1 Location: Left arm, BP Patient Position: Sitting)   Pulse 68   Temp 97.9 °F (36.6 °C) (Oral)   Resp 14   Ht 5' 1.5\" (1.562 m)   Wt 130 lb (59 kg)   SpO2 98%   BMI 24.17 kg/m²     Physical Exam  Vitals signs and nursing note reviewed. Constitutional:       Appearance: She is well-developed. Comments:      Neck:      Vascular: No JVD. Cardiovascular:      Rate and Rhythm: Normal rate and regular rhythm. Heart sounds: No murmur. No friction rub. No gallop. Pulmonary:      Effort: Pulmonary effort is normal. No respiratory distress. Breath sounds: Normal breath sounds. No wheezing. Skin:     General: Skin is warm. Neurological:      Mental Status: She is alert and oriented to person, place, and time. ASSESSMENT and PLAN  Encounter Diagnoses   Name Primary?  Mixed hyperlipidemia Yes     Orders Placed This Encounter    LIPID PANEL     Labs updated  Recheck 6 mo if stable  I have discussed the diagnosis with the patient and the intended plan as seen in the above orders. The patient has received an after-visit summary and questions were answered concerning future plans. Patient conveyed understanding of the plan at the time of the visit.     Jacque Mheta, MSN, ANP  12/6/2019

## 2019-12-06 NOTE — PROGRESS NOTES
Anitha Estrada is a 64 y.o. female , id x 2(name and ). Reviewed record, history, and  medications. Chief Complaint   Patient presents with    Labs     fasting       Vitals:    19 0842   BP: 114/65   Pulse: 68   Resp: 14   Temp: 97.9 °F (36.6 °C)   TempSrc: Oral   SpO2: 98%   Weight: 130 lb (59 kg)   Height: 5' 1.5\" (1.562 m)   PainSc:   0 - No pain       Coordination of Care Questionnaire:   1) Have you been to an emergency room, urgent care, or hospitalized since your last visit?   no       2. Have seen or consulted any other health care provider since your last visit? NO        3 most recent PHQ Screens 2019   Little interest or pleasure in doing things Not at all   Feeling down, depressed, irritable, or hopeless Not at all   Total Score PHQ 2 0       Patient is accompanied by self I have received verbal consent from Anitha Estrada to discuss any/all medical information while they are present in the room.

## 2021-12-13 ENCOUNTER — HOSPITAL ENCOUNTER (OUTPATIENT)
Dept: LAB | Age: 61
Discharge: HOME | End: 2021-12-13

## 2021-12-13 LAB
ALBUMIN SERPL-MCNC: 4.4 G/DL (ref 3.5–5)
ALBUMIN/GLOB SERPL: 1.3 {RATIO} (ref 1.1–2.2)
ALP SERPL-CCNC: 74 U/L (ref 45–117)
ALT SERPL-CCNC: 29 U/L (ref 12–78)
ANION GAP SERPL CALC-SCNC: 5 MMOL/L (ref 5–15)
AST SERPL-CCNC: 17 U/L (ref 15–37)
BILIRUB SERPL-MCNC: 0.4 MG/DL (ref 0.2–1)
BUN SERPL-MCNC: 19 MG/DL (ref 6–20)
BUN/CREAT SERPL: 28 (ref 12–20)
CALCIUM SERPL-MCNC: 9.4 MG/DL (ref 8.5–10.1)
CHLORIDE SERPL-SCNC: 106 MMOL/L (ref 97–108)
CHOLEST SERPL-MCNC: 148 MG/DL
CO2 SERPL-SCNC: 28 MMOL/L (ref 21–32)
CREAT SERPL-MCNC: 0.69 MG/DL (ref 0.55–1.02)
EST. AVERAGE GLUCOSE BLD GHB EST-MCNC: 100 MG/DL
GLOBULIN SER CALC-MCNC: 3.3 G/DL (ref 2–4)
GLUCOSE SERPL-MCNC: 121 MG/DL (ref 65–100)
HBA1C MFR BLD: 5.1 % (ref 4–5.6)
HDLC SERPL-MCNC: 64 MG/DL
HDLC SERPL: 2.3 {RATIO} (ref 0–5)
LDLC SERPL CALC-MCNC: 62.8 MG/DL (ref 0–100)
POTASSIUM SERPL-SCNC: 4.2 MMOL/L (ref 3.5–5.1)
PROT SERPL-MCNC: 7.7 G/DL (ref 6.4–8.2)
SODIUM SERPL-SCNC: 139 MMOL/L (ref 136–145)
TRIGL SERPL-MCNC: 106 MG/DL (ref ?–150)
VLDLC SERPL CALC-MCNC: 21.2 MG/DL

## 2021-12-13 PROCEDURE — 80053 COMPREHEN METABOLIC PANEL: CPT

## 2021-12-13 PROCEDURE — 80061 LIPID PANEL: CPT

## 2021-12-13 PROCEDURE — 83036 HEMOGLOBIN GLYCOSYLATED A1C: CPT

## 2022-02-01 ENCOUNTER — HOSPITAL ENCOUNTER (OUTPATIENT)
Dept: LAB | Age: 62
Discharge: HOME | End: 2022-02-01

## 2022-02-01 PROCEDURE — 80061 LIPID PANEL: CPT

## 2022-02-02 LAB
CHOLEST SERPL-MCNC: 203 MG/DL
HDLC SERPL-MCNC: 64 MG/DL
HDLC SERPL: 3.2 {RATIO} (ref 0–5)
LDLC SERPL CALC-MCNC: 101 MG/DL (ref 0–100)
TRIGL SERPL-MCNC: 190 MG/DL (ref ?–150)
VLDLC SERPL CALC-MCNC: 38 MG/DL

## 2022-03-22 ENCOUNTER — HOSPITAL ENCOUNTER (OUTPATIENT)
Dept: LAB | Age: 62
Discharge: HOME | End: 2022-03-22

## 2022-03-22 PROCEDURE — 86677 HELICOBACTER PYLORI ANTIBODY: CPT

## 2022-03-23 LAB
H PYLORI IGA SER-ACNC: 14.3 UNITS (ref 0–8.9)
H PYLORI IGG SER IA-ACNC: 6.03 INDEX VALUE (ref 0–0.79)
H PYLORI IGM SER-ACNC: <9 UNITS (ref 0–8.9)

## 2022-08-24 ENCOUNTER — HOSPITAL ENCOUNTER (OUTPATIENT)
Dept: LAB | Age: 62
Discharge: HOME | End: 2022-08-24

## 2022-08-24 PROCEDURE — 80061 LIPID PANEL: CPT

## 2022-08-25 LAB
CHOLEST SERPL-MCNC: 254 MG/DL
HDLC SERPL-MCNC: 62 MG/DL
HDLC SERPL: 4.1 {RATIO} (ref 0–5)
LDLC SERPL CALC-MCNC: 162.4 MG/DL (ref 0–100)
TRIGL SERPL-MCNC: 148 MG/DL (ref ?–150)
VLDLC SERPL CALC-MCNC: 29.6 MG/DL

## 2022-12-20 ENCOUNTER — HOSPITAL ENCOUNTER (OUTPATIENT)
Dept: LAB | Age: 62
Discharge: HOME | End: 2022-12-20

## 2022-12-20 PROCEDURE — 88142 CYTOPATH C/V THIN LAYER: CPT

## 2022-12-20 PROCEDURE — 87624 HPV HI-RISK TYP POOLED RSLT: CPT

## 2023-01-09 ENCOUNTER — OFFICE VISIT (OUTPATIENT)
Dept: ORTHOPEDIC SURGERY | Age: 63
End: 2023-01-09
Payer: SUBSIDIZED

## 2023-01-09 VITALS — BODY MASS INDEX: 22.2 KG/M2 | WEIGHT: 130 LBS | HEIGHT: 64 IN

## 2023-01-09 DIAGNOSIS — M25.522 LEFT ELBOW PAIN: ICD-10-CM

## 2023-01-09 DIAGNOSIS — M77.12 LEFT LATERAL EPICONDYLITIS: Primary | ICD-10-CM

## 2023-01-09 PROCEDURE — 20550 NJX 1 TENDON SHEATH/LIGAMENT: CPT | Performed by: PHYSICIAN ASSISTANT

## 2023-01-09 PROCEDURE — 99203 OFFICE O/P NEW LOW 30 MIN: CPT | Performed by: PHYSICIAN ASSISTANT

## 2023-01-09 RX ORDER — BETAMETHASONE SODIUM PHOSPHATE AND BETAMETHASONE ACETATE 3; 3 MG/ML; MG/ML
6 INJECTION, SUSPENSION INTRA-ARTICULAR; INTRALESIONAL; INTRAMUSCULAR; SOFT TISSUE ONCE
Status: COMPLETED | OUTPATIENT
Start: 2023-01-09 | End: 2023-01-09

## 2023-01-09 RX ORDER — BUPIVACAINE HYDROCHLORIDE 7.5 MG/ML
7.5 INJECTION, SOLUTION EPIDURAL; RETROBULBAR ONCE
Status: COMPLETED | OUTPATIENT
Start: 2023-01-09 | End: 2023-01-09

## 2023-01-09 RX ADMIN — BETAMETHASONE SODIUM PHOSPHATE AND BETAMETHASONE ACETATE 6 MG: 3; 3 INJECTION, SUSPENSION INTRA-ARTICULAR; INTRALESIONAL; INTRAMUSCULAR; SOFT TISSUE at 13:30

## 2023-01-09 RX ADMIN — BUPIVACAINE HYDROCHLORIDE 7.5 MG: 7.5 INJECTION, SOLUTION EPIDURAL; RETROBULBAR at 13:30

## 2023-01-09 NOTE — PROGRESS NOTES
HPI: Ary Brown (: 1960) is a 63 y.o. female, patient, here for evaluation of the following chief complaint(s): Patient presents with complaint of left lateral elbow pain with occasional radiation to the wrist and upper arm. This began about 3 months ago after cleaning with too much effort. She was evaluated by her primary care physician who prescribed a steroid and an NSAID and also did x-rays, per self-report. The anti-inflammatories did not help much. 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 No numbness or tingling in the hand. No other complaints or concerns. Patient is Faroese-speaking and presents with a family member who is translating today. Elbow Pain (Left)       Vitals:  Ht 5' 4\" (1.626 m)   Wt 130 lb (59 kg)   BMI 22.31 kg/m²    Body mass index is 22.31 kg/m². No Known Allergies    No current outpatient medications on file. No current facility-administered medications for this visit. Past Medical History:   Diagnosis Date    Headache     Hypercholesterolemia         Past Surgical History:   Procedure Laterality Date    HX ORTHOPAEDIC         Family History   Problem Relation Age of Onset    Ovarian Cancer Mother     Stroke Father     Cancer Paternal Aunt     Cancer Paternal Uncle         Social History     Tobacco Use    Smoking status: Never    Smokeless tobacco: Never   Substance Use Topics    Alcohol use: Never    Drug use: Never        Review of Systems    Constitutional: No fevers, chills, night sweats, excessive fatigue or weight loss. Musculoskeletal: + Left elbow pain. Neurologic: No headache, blurred vision, and no areas of focal weakness or numbness. Normal gait. No sensory problems. Respiratory: No dyspnea on exertion, orthopnea, chest pain, cough or hemoptysis.   Cardiovascular: No anginal chest pain, irregular heart beat, tachycardia, palpitations or orthopnea  Integumentary: No chronic rashes, inflammation, ulcerations, pruritus, petechiae, purpura, ecchymoses, or skin changes       Physical Exam    General: Alert, cooperative, no distress  Musculosketal: Left elbow - Normal range of motion. Normal sensation. No erythema, edema or warmth to the joint. No fluid collection. Negative hook test. No sign of triceps tendon disruption. Discomfort reproduced with resisted extension and flexion. Tenderness to palpation at the lateral epicondyle. Neurologic:  CNII-XII intact, Normal strength, sensation, and reflexes throughout    Imaging:  None today       ASSESSMENT/PLAN:  Below is the assessment and plan developed based on review of pertinent history, physical exam, labs, studies, and medications. left lateral elbow pain with radiation to the wrist and upper arm. This began about 3 months ago after cleaning with too much effort. She was evaluated by her primary care physician who prescribed a steroid and an NSAID and also did x-rays, per self-report. The anti-inflammatories did not help much. 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 Patient is [place]ic-speaking and presents with a family member who is translating today. Clinical exam is consistent with left lateral epicondylitis. Treatment options discussed with the patient and she opted for a therapeutic injection. Injection administered 1/9/2023. She was given home exercises in [place]ic as well as a description of her diagnosis in [facility] [place] Elbow was wrapped in an ACE bandage for compressive support. Patient will follow up in the office in 3 weeks to review her progress as needed. Patient consent obtained prior to the injection(s). Consent forms signed. Discussed risks/benefits of cortisone injection and patient gave verbal consent.   Under sterile conditions, the left elbow epicondylar corticosteroid injection was performed with 1 cc 0.75% Bupivacaine and 1cc 6mg Betamethasone. She tolerated the procedure well. 1. Left lateral epicondylitis  -     INJECT TENDON SHEATH/LIGAMENT  -     betamethasone (CELESTONE) injection 6 mg; 6 mg, Other, ONCE, 1 dose, On Mon 1/9/23 at 1400  -     bupivacaine (PF) (MARCAINE) 0.75 % (7.5 mg/mL) injection 7.5 mg; 7.5 mg, SubCUTAneous, ONCE, 1 dose, On Mon 1/9/23 at 1400  -     AR ELASTIC BANDAGES  2. Left elbow pain    Return in about 3 weeks (around 1/30/2023). Dr. Jennifer Calderon was available for immediate consult during this encounter. An electronic signature was used to authenticate this note.   -- Abby Boothe PA-C

## 2023-03-23 ENCOUNTER — HOSPITAL ENCOUNTER (OUTPATIENT)
Age: 63
Setting detail: OUTPATIENT SURGERY
Discharge: HOME | End: 2023-03-23
Attending: INTERNAL MEDICINE | Admitting: INTERNAL MEDICINE

## 2023-03-23 ENCOUNTER — ANESTHESIA (OUTPATIENT)
Dept: ENDOSCOPY | Age: 63
End: 2023-03-23

## 2023-03-23 ENCOUNTER — ANESTHESIA EVENT (OUTPATIENT)
Dept: ENDOSCOPY | Age: 63
End: 2023-03-23

## 2023-03-23 VITALS
TEMPERATURE: 97.6 F | DIASTOLIC BLOOD PRESSURE: 67 MMHG | HEIGHT: 63 IN | RESPIRATION RATE: 16 BRPM | OXYGEN SATURATION: 100 % | BODY MASS INDEX: 23.28 KG/M2 | SYSTOLIC BLOOD PRESSURE: 132 MMHG | WEIGHT: 131.39 LBS | HEART RATE: 61 BPM

## 2023-03-23 PROCEDURE — 88342 IMHCHEM/IMCYTCHM 1ST ANTB: CPT

## 2023-03-23 PROCEDURE — 74011250636 HC RX REV CODE- 250/636: Performed by: NURSE ANESTHETIST, CERTIFIED REGISTERED

## 2023-03-23 PROCEDURE — 76060000031 HC ANESTHESIA FIRST 0.5 HR: Performed by: INTERNAL MEDICINE

## 2023-03-23 PROCEDURE — 77030021593 HC FCPS BIOP ENDOSC BSC -A: Performed by: INTERNAL MEDICINE

## 2023-03-23 PROCEDURE — 77030013992 HC SNR POLYP ENDOSC BSC -B: Performed by: INTERNAL MEDICINE

## 2023-03-23 PROCEDURE — 74011000250 HC RX REV CODE- 250: Performed by: NURSE ANESTHETIST, CERTIFIED REGISTERED

## 2023-03-23 PROCEDURE — 76040000019: Performed by: INTERNAL MEDICINE

## 2023-03-23 PROCEDURE — 88305 TISSUE EXAM BY PATHOLOGIST: CPT

## 2023-03-23 PROCEDURE — 2709999900 HC NON-CHARGEABLE SUPPLY: Performed by: INTERNAL MEDICINE

## 2023-03-23 RX ORDER — FLUMAZENIL 0.1 MG/ML
0.2 INJECTION INTRAVENOUS
Status: DISCONTINUED | OUTPATIENT
Start: 2023-03-23 | End: 2023-03-23 | Stop reason: HOSPADM

## 2023-03-23 RX ORDER — NALOXONE HYDROCHLORIDE 0.4 MG/ML
0.4 INJECTION, SOLUTION INTRAMUSCULAR; INTRAVENOUS; SUBCUTANEOUS
Status: DISCONTINUED | OUTPATIENT
Start: 2023-03-23 | End: 2023-03-23 | Stop reason: HOSPADM

## 2023-03-23 RX ORDER — SODIUM CHLORIDE 9 MG/ML
INJECTION, SOLUTION INTRAVENOUS
Status: DISCONTINUED | OUTPATIENT
Start: 2023-03-23 | End: 2023-03-23 | Stop reason: HOSPADM

## 2023-03-23 RX ORDER — LIDOCAINE HYDROCHLORIDE 20 MG/ML
INJECTION, SOLUTION EPIDURAL; INFILTRATION; INTRACAUDAL; PERINEURAL AS NEEDED
Status: DISCONTINUED | OUTPATIENT
Start: 2023-03-23 | End: 2023-03-23 | Stop reason: HOSPADM

## 2023-03-23 RX ORDER — MIDAZOLAM HYDROCHLORIDE 1 MG/ML
.25-5 INJECTION, SOLUTION INTRAMUSCULAR; INTRAVENOUS
Status: DISCONTINUED | OUTPATIENT
Start: 2023-03-23 | End: 2023-03-23 | Stop reason: HOSPADM

## 2023-03-23 RX ORDER — PROPOFOL 10 MG/ML
INJECTION, EMULSION INTRAVENOUS AS NEEDED
Status: DISCONTINUED | OUTPATIENT
Start: 2023-03-23 | End: 2023-03-23 | Stop reason: HOSPADM

## 2023-03-23 RX ORDER — PROPOFOL 10 MG/ML
INJECTION, EMULSION INTRAVENOUS
Status: DISCONTINUED | OUTPATIENT
Start: 2023-03-23 | End: 2023-03-23 | Stop reason: HOSPADM

## 2023-03-23 RX ORDER — ATROPINE SULFATE 0.1 MG/ML
0.4 INJECTION INTRAVENOUS
Status: DISCONTINUED | OUTPATIENT
Start: 2023-03-23 | End: 2023-03-23 | Stop reason: HOSPADM

## 2023-03-23 RX ORDER — EPINEPHRINE 0.1 MG/ML
1 INJECTION INTRACARDIAC; INTRAVENOUS
Status: DISCONTINUED | OUTPATIENT
Start: 2023-03-23 | End: 2023-03-23 | Stop reason: HOSPADM

## 2023-03-23 RX ORDER — DEXTROMETHORPHAN/PSEUDOEPHED 2.5-7.5/.8
1.2 DROPS ORAL
Status: DISCONTINUED | OUTPATIENT
Start: 2023-03-23 | End: 2023-03-23 | Stop reason: HOSPADM

## 2023-03-23 RX ORDER — SODIUM CHLORIDE 9 MG/ML
50 INJECTION, SOLUTION INTRAVENOUS CONTINUOUS
Status: DISCONTINUED | OUTPATIENT
Start: 2023-03-23 | End: 2023-03-23 | Stop reason: HOSPADM

## 2023-03-23 RX ADMIN — PROPOFOL 130 MCG/KG/MIN: 10 INJECTION, EMULSION INTRAVENOUS at 09:08

## 2023-03-23 RX ADMIN — SODIUM CHLORIDE: 900 INJECTION, SOLUTION INTRAVENOUS at 08:59

## 2023-03-23 RX ADMIN — LIDOCAINE HYDROCHLORIDE 100 MG: 20 INJECTION, SOLUTION EPIDURAL; INFILTRATION; INTRACAUDAL; PERINEURAL at 09:08

## 2023-03-23 RX ADMIN — PROPOFOL 100 MG: 10 INJECTION, EMULSION INTRAVENOUS at 09:08

## 2023-03-23 NOTE — PROCEDURES
Zuri Sánchez M.D.  [phone]            3/23/2023          Colonoscopy Operative Report  Karely Masters  :  1960  [facility] Medical Record Number:  [id number]      Indications:    Screening colonoscopy     :  Darrel Mack MD    Assistant -- None  Implants -- None    Referring Provider: Fernanda Rashid MD    Sedation:  MAC anesthesia Propofol    Pre-Procedural Exam:      Airway: clear,  No airway problems anticipated  Heart: RRR, without gallops or rubs  Lungs: clear bilaterally without wheezes, crackles, or rhonchi  Abdomen: soft, nontender, nondistended, bowel sounds present  Mental Status: awake, alert and oriented to person, place and time     Procedure Details:  After informed consent was obtained with all risks and benefits of procedure explained and preoperative exam completed, the patient was taken to the endoscopy suite and placed in the left lateral decubitus position. Upon sequential sedation as per above, a digital rectal exam was performed. The Olympus videocolonoscope  was inserted in the rectum and carefully advanced to the cecum, which was identified by the ileocecal valve and appendiceal orifice. The quality of preparation was good. The colonoscope was slowly withdrawn with careful inspection and evaluation between folds. Retroflexion in the rectum was performed. Findings:     Cecum: 1  Sessile polyp(s), the largest 8 mm in size;  Ascending Colon: normal  Transverse Colon: normal  Descending Colon: normal  Sigmoid: normal  Rectum: normal    Interventions:  1 complete polypectomy were performed using cold snare  and the polyps were  retrieved    Specimen Removed:  specimen #1, 8 mm in size, located in the cecum removed by cold snare and retrieved for pathology    Complications: None. EBL:  None. Impression:  1 polyp removed as above    Recommendations:  -Await pathology.   -Repeat colonoscopy in 7 years     Discharge Disposition:  Home in the company of a  when able to ambulate.     Rusty Aguila MD  3/23/2023  9:30 AM

## 2023-03-23 NOTE — H&P
Elaine Thomas M.D.  [phone]            History and Physical       NAME:  Faviola Lemus   :   1960   MRN:   [id number]       Referring Physician:  Yesneia Kamara MD      Consult Date: 3/23/2023 8:59 AM    Chief Complaint:  dyspepsia & Colon cancer screening    History of Present Illness: Today, she has been experiencing epigastric/LUQ pain for past 2 years. Feels worse after eating, particularly if she eats \"too much\". She reportedly had H. pylori + serology this summer. Was treated with regimen and feels better. Still with epigastric pain but overall better. Not currently on any acid regimen. No prior colonoscopy. PMH:  Past Medical History:   Diagnosis Date    Headache     Hypercholesterolemia     Vertigo        PSH:  Past Surgical History:   Procedure Laterality Date    HX CATARACT REMOVAL Bilateral     HX DILATION AND CURETTAGE         Allergies:  No Known Allergies    Home Medications:  Prior to Admission Medications   Prescriptions Last Dose Informant Patient Reported? Taking?   omega-3/dha/epa/fish oil (OMEGA-3 PO) 3/16/2023  Yes Yes   Sig: Take 1 Capsule by mouth daily. Facility-Administered Medications: None       Hospital Medications:  No current facility-administered medications for this encounter.        Social History:  Social History     Tobacco Use    Smoking status: Never    Smokeless tobacco: Never   Substance Use Topics    Alcohol use: Never       Family History:  Family History   Problem Relation Age of Onset    Ovarian Cancer Mother     Stroke Father     Cancer Paternal Aunt     Cancer Paternal Uncle              Review of Systems:      Constitutional: negative fever, negative chills, negative weight loss  Eyes:   negative visual changes  ENT:   negative sore throat, tongue or lip swelling  Respiratory:  negative cough, negative dyspnea  Cards:  negative for chest pain, palpitations, lower extremity edema  GI:   See HPI  :  negative for frequency, dysuria  Integument:  negative for rash and pruritus  Heme:  negative for easy bruising and gum/nose bleeding  Musculoskel: negative for myalgias,  back pain and muscle weakness  Neuro: negative for headaches, dizziness, vertigo  Psych:  negative for feelings of anxiety, depression       Objective:   Patient Vitals for the past 8 hrs:   BP Temp Pulse Resp SpO2 Height Weight   03/23/23 0823 (!) 151/77 97.7 °F (36.5 °C) 75 18 100 % 5' 3\" (1.6 m) 59.6 kg (131 lb 6.3 oz)     No intake/output data recorded. No intake/output data recorded. EXAM:     NEURO-a&o   HEENT-wnl   LUNGS-clear    COR-regular rate and rhythym     ABD-soft , no tenderness, no rebound, good bs     EXT-no edema     Data Review     No results for input(s): WBC, HGB, HCT, PLT, HGBEXT, HCTEXT, PLTEXT in the last 72 hours. No results for input(s): NA, K, CL, CO2, BUN, CREA, GLU, PHOS, CA in the last 72 hours. No results for input(s): AP, TBIL, TP, ALB, GLOB, GGT, AML, LPSE in the last 72 hours. No lab exists for component: SGOT, GPT, AMYP, HLPSE  No results for input(s): INR, PTP, APTT, INREXT in the last 72 hours. There is no problem list on file for this patient. Assessment:     Dyspepsia  Colon cancer screening   Plan:     EGD  Colonoscopy today.      Signed By: Melyssa Styles MD     3/23/2023  8:59 AM

## 2023-03-23 NOTE — DISCHARGE INSTRUCTIONS
[address] [facility]. Ana Maria Pantoja M.D.  [phone]                 COLON and EGD DISCHARGE INSTRUCTIONS    3/23/2023    Hosea Shahram  :  1960  [facility] Medical Record Number:  [id number]      DISCOMFORT:  Sore throat- throat lozenges or warm salt water gargle  Redness at IV site- apply warm compress to area; if redness or soreness persist- contact your physician  There may be a slight amount of blood passed from the rectum  Gaseous discomfort- walking, belching will help relieve any discomfort  You may not operate a vehicle for 12 hours  You may not engage in an occupation involving machinery or appliances for rest of today  You may not drink alcoholic beverages for at least 12 hours  Avoid making any critical decisions for at least 24 hour  DIET:   High fiber diet. - however -  remember your colon is empty and a heavy meal will produce gas. Avoid these foods:  vegetables, fried / greasy foods, carbonated drinks for today     ACTIVITY:  You may  resume your normal daily activities it is recommended that you spend the remainder of the day resting -  avoid any strenuous activity and driving. CALL M.D. ANY SIGN OF:   Increasing pain, nausea, vomiting  Abdominal distension (swelling)  New increased bleeding (oral or rectal)  Fever (chills)  Pain in chest area  Bloody discharge from nose or mouth  Shortness of breath      Follow-up Instructions:   Call Dr. Jerson Kenney if any questions at [phone]. Results of procedure / biopsy in 7 to 10 days, we will call you with these results.   Your endoscopy showed normal exam   Your colonoscopy showed 1 polyp removed

## 2023-03-23 NOTE — PERIOP NOTES
Received report from Sherron Herman CRNA. See anesthesia record. Patient taken to post-recovery. Post-recovery report given to LUIS FELIPE NERI RN. Patient's ABD remains soft and non-tender post procedure. Pt has no complaints at this time and tolerated the procedure well. Endoscope was pre-cleaned at bedside immediately following procedure by John.

## 2023-03-23 NOTE — PROCEDURES
Amna Mason M.D.  [phone]           3/23/2023                EGD Operative Report  Kevin Reed  :  1960  [facility] Medical Record Number:  [id number]      Indication:  Dyspepsia      : Sienna Faust MD    Assistant -- None  Implants -- None    Referring Provider:  Tonya Duran MD      Anesthesia/Sedation:  MAC anesthesia Propofol    Airway assessment: No airway problems anticipated    Pre-Procedural Exam:      Airway: clear, no airway problems anticipated  Heart: RRR, without gallops or rubs  Lungs: clear bilaterally without wheezes, crackles, or rhonchi  Abdomen: soft, nontender, nondistended, bowel sounds present  Mental Status: awake, alert and oriented to person, place and time       Procedure Details     After infomed consent was obtained for the procedure, with all risks and benefits of procedure explained the patient was taken to the endoscopy suite and placed in the left lateral decubitus position. Following sequential administration of sedation as per above, the endoscope was inserted into the mouth and advanced under direct vision to second portion of the duodenum. A careful inspection was made as the gastroscope was withdrawn, including a retroflexed view of the proximal stomach; findings and interventions are described below. Findings:   Esophagus:normal  Stomach: normal   Duodenum/jejunum: normal    Therapies:  biopsy of stomach  - r/o H.pylori  biopsy of duodenal - r/o celiac disease    Specimens: as above           Complications:   None; patient tolerated the procedure well. EBL:  None. Impression:    1- Normal Upper Endoscopy.         Recommendations:    -Await pathology.  -Proceed with colonoscopy today as planned    Sienna Faust MD

## 2023-03-23 NOTE — PROGRESS NOTES
Agustina Calderon  1960  [id number]    Situation:  Verbal report received from:   Mone Jefferson RN   Procedure: Procedure(s):  ESOPHAGOGASTRODUODENOSCOPY  COLONOSCOPY  ESOPHAGOGASTRODUODENAL (EGD) BIOPSY  ENDOSCOPIC POLYPECTOMY    Background:    Preoperative diagnosis: DYSPEPSIA, SCREENING  Postoperative diagnosis: 1- Normal Upper Endoscopy. 2- Cecum Polyp. :  Dr. Thaira Ferrer   Assistant(s): Endoscopy Technician-1: Emery Jaime  Endoscopy RN-1: Slava Hatfield    Specimens:   ID Type Source Tests Collected by Time Destination   1 : Duodenum Biopsy. Preservative Duodenum  Rica Lepe MD 3/23/2023 9665 Pathology   2 : Gastric Antrum  Biopsy Preservative Gastric  Rica Lepe MD 3/23/2023 1586 Pathology   3 : Cecum Polyp. Paco Gonzaelz MD 3/23/2023 6421 Pathology     H. Pylori  no    Assessment:  Intra-procedure medications     Anesthesia gave intra-procedure sedation and medications, see anesthesia flow sheet yes    Intravenous fluids: NS@ KVO     Vital signs stable   yes    Abdominal assessment: round and soft   yes    Recommendation:  Discharge patient per MD order  yes.   Return to floor  outpatient   Family or Friend   son  Permission to share finding with family or friend yes

## 2023-03-23 NOTE — ANESTHESIA PREPROCEDURE EVALUATION
Relevant Problems   No relevant active problems       Anesthetic History   No history of anesthetic complications            Review of Systems / Medical History  Patient summary reviewed and pertinent labs reviewed    Pulmonary  Within defined limits                 Neuro/Psych   Within defined limits           Cardiovascular              Hyperlipidemia    Exercise tolerance: >4 METS     GI/Hepatic/Renal  Within defined limits              Endo/Other  Within defined limits           Other Findings              Physical Exam    Airway  Mallampati: II  TM Distance: 4 - 6 cm         Cardiovascular  Regular rate and rhythm,  S1 and S2 normal,  no murmur, click, rub, or gallop             Dental    Dentition: Full lower dentures and Full upper dentures     Pulmonary                 Abdominal         Other Findings            Anesthetic Plan    ASA: 2  Anesthesia type: MAC          Induction: Intravenous  Anesthetic plan and risks discussed with: Patient

## 2023-03-23 NOTE — PROGRESS NOTES
Endoscopy discharge instructions have been reviewed and given to patient. The patient verbalized understanding and acceptance of instructions. Dr. Heide Mckeon  discussed with patient procedure findings and next steps.

## 2023-04-06 NOTE — ANESTHESIA POSTPROCEDURE EVALUATION
Procedure(s):  ESOPHAGOGASTRODUODENOSCOPY  COLONOSCOPY  ESOPHAGOGASTRODUODENAL (EGD) BIOPSY  ENDOSCOPIC POLYPECTOMY.     MAC    Anesthesia Post Evaluation      Multimodal analgesia: multimodal analgesia not used between 6 hours prior to anesthesia start to PACU discharge  Patient location during evaluation: PACU  Patient participation: complete - patient participated  Level of consciousness: awake  Pain score: 0  Airway patency: patent  Cardiovascular status: acceptable  Respiratory status: acceptable  Hydration status: acceptable  Post anesthesia nausea and vomiting:  none  Final Post Anesthesia Temperature Assessment:  Normothermia (36.0-37.5 degrees C)      INITIAL Post-op Vital signs:   Vitals Value Taken Time   /67 03/23/23 0955   Temp 36.4 °C (97.6 °F) 03/23/23 0940   Pulse 61 03/23/23 0955   Resp 16 03/23/23 0955   SpO2 100 % 03/23/23 0955

## 2023-04-18 ENCOUNTER — HOSPITAL ENCOUNTER (OUTPATIENT)
Dept: LAB | Age: 63
Discharge: HOME | End: 2023-04-18

## 2023-04-18 PROCEDURE — 36415 COLL VENOUS BLD VENIPUNCTURE: CPT

## 2023-04-18 PROCEDURE — 83721 ASSAY OF BLOOD LIPOPROTEIN: CPT

## 2023-04-18 PROCEDURE — 80053 COMPREHEN METABOLIC PANEL: CPT

## 2023-04-18 PROCEDURE — 80061 LIPID PANEL: CPT

## 2023-04-19 LAB
ALBUMIN SERPL-MCNC: 4.3 G/DL (ref 3.5–5)
ALBUMIN/GLOB SERPL: 1.3 (ref 1.1–2.2)
ALP SERPL-CCNC: 83 U/L (ref 45–117)
ALT SERPL-CCNC: 28 U/L (ref 12–78)
ANION GAP SERPL CALC-SCNC: 6 MMOL/L (ref 5–15)
AST SERPL-CCNC: 20 U/L (ref 15–37)
BILIRUB SERPL-MCNC: 0.2 MG/DL (ref 0.2–1)
BUN SERPL-MCNC: 18 MG/DL (ref 6–20)
BUN/CREAT SERPL: 26 (ref 12–20)
CALCIUM SERPL-MCNC: 10.3 MG/DL (ref 8.5–10.1)
CHLORIDE SERPL-SCNC: 105 MMOL/L (ref 97–108)
CHOLEST SERPL-MCNC: 225 MG/DL
CO2 SERPL-SCNC: 27 MMOL/L (ref 21–32)
CREAT SERPL-MCNC: 0.68 MG/DL (ref 0.55–1.02)
GLOBULIN SER CALC-MCNC: 3.4 G/DL (ref 2–4)
GLUCOSE SERPL-MCNC: 94 MG/DL (ref 65–100)
HDLC SERPL-MCNC: 52 MG/DL
HDLC SERPL: 4.3 (ref 0–5)
LDLC SERPL CALC-MCNC: ABNORMAL MG/DL (ref 0–100)
LDLC SERPL DIRECT ASSAY-MCNC: 140 MG/DL (ref 0–100)
POTASSIUM SERPL-SCNC: 4.9 MMOL/L (ref 3.5–5.1)
PROT SERPL-MCNC: 7.7 G/DL (ref 6.4–8.2)
SODIUM SERPL-SCNC: 138 MMOL/L (ref 136–145)
TRIGL SERPL-MCNC: 443 MG/DL (ref ?–150)
VLDLC SERPL CALC-MCNC: ABNORMAL MG/DL

## 2024-01-29 ENCOUNTER — HOSPITAL ENCOUNTER (OUTPATIENT)
Facility: HOSPITAL | Age: 64
Setting detail: SPECIMEN
Discharge: HOME | End: 2024-02-01

## 2024-01-29 LAB
COMMENT:: NORMAL
CREAT UR-MCNC: 51.1 MG/DL
MICROALBUMIN UR-MCNC: 0.61 MG/DL
MICROALBUMIN/CREAT UR-RTO: 12 MG/G (ref 0–30)
SPECIMEN HOLD: NORMAL

## 2024-01-29 PROCEDURE — 83036 HEMOGLOBIN GLYCOSYLATED A1C: CPT

## 2024-01-29 PROCEDURE — 80061 LIPID PANEL: CPT

## 2024-01-29 PROCEDURE — 82570 ASSAY OF URINE CREATININE: CPT

## 2024-01-29 PROCEDURE — 36415 COLL VENOUS BLD VENIPUNCTURE: CPT

## 2024-01-29 PROCEDURE — 84443 ASSAY THYROID STIM HORMONE: CPT

## 2024-01-29 PROCEDURE — 85025 COMPLETE CBC W/AUTO DIFF WBC: CPT

## 2024-01-29 PROCEDURE — 87086 URINE CULTURE/COLONY COUNT: CPT

## 2024-01-29 PROCEDURE — 82043 UR ALBUMIN QUANTITATIVE: CPT

## 2024-01-29 PROCEDURE — 80053 COMPREHEN METABOLIC PANEL: CPT

## 2024-01-30 LAB
ALBUMIN SERPL-MCNC: 4.2 G/DL (ref 3.5–5)
ALBUMIN/GLOB SERPL: 1.1 (ref 1.1–2.2)
ALP SERPL-CCNC: 79 U/L (ref 45–117)
ALT SERPL-CCNC: 36 U/L (ref 12–78)
ANION GAP SERPL CALC-SCNC: 6 MMOL/L (ref 5–15)
AST SERPL-CCNC: 20 U/L (ref 15–37)
BACTERIA SPEC CULT: NORMAL
BASOPHILS # BLD: 0 K/UL (ref 0–0.1)
BASOPHILS NFR BLD: 1 % (ref 0–1)
BILIRUB SERPL-MCNC: 0.6 MG/DL (ref 0.2–1)
BUN SERPL-MCNC: 14 MG/DL (ref 6–20)
BUN/CREAT SERPL: 22 (ref 12–20)
CALCIUM SERPL-MCNC: 9.8 MG/DL (ref 8.5–10.1)
CHLORIDE SERPL-SCNC: 108 MMOL/L (ref 97–108)
CHOLEST SERPL-MCNC: 265 MG/DL
CO2 SERPL-SCNC: 27 MMOL/L (ref 21–32)
CREAT SERPL-MCNC: 0.64 MG/DL (ref 0.55–1.02)
DIFFERENTIAL METHOD BLD: NORMAL
EOSINOPHIL # BLD: 0.1 K/UL (ref 0–0.4)
EOSINOPHIL NFR BLD: 1 % (ref 0–7)
ERYTHROCYTE [DISTWIDTH] IN BLOOD BY AUTOMATED COUNT: 12.6 % (ref 11.5–14.5)
EST. AVERAGE GLUCOSE BLD GHB EST-MCNC: 108 MG/DL
GLOBULIN SER CALC-MCNC: 3.7 G/DL (ref 2–4)
GLUCOSE SERPL-MCNC: 85 MG/DL (ref 65–100)
HBA1C MFR BLD: 5.4 % (ref 4–5.6)
HCT VFR BLD AUTO: 37.8 % (ref 35–47)
HDLC SERPL-MCNC: 63 MG/DL
HDLC SERPL: 4.2 (ref 0–5)
HGB BLD-MCNC: 12.8 G/DL (ref 11.5–16)
IMM GRANULOCYTES # BLD AUTO: 0 K/UL (ref 0–0.04)
IMM GRANULOCYTES NFR BLD AUTO: 0 % (ref 0–0.5)
LDLC SERPL CALC-MCNC: 153.6 MG/DL (ref 0–100)
LYMPHOCYTES # BLD: 2.2 K/UL (ref 0.8–3.5)
LYMPHOCYTES NFR BLD: 43 % (ref 12–49)
MCH RBC QN AUTO: 30.5 PG (ref 26–34)
MCHC RBC AUTO-ENTMCNC: 33.9 G/DL (ref 30–36.5)
MCV RBC AUTO: 90 FL (ref 80–99)
MONOCYTES # BLD: 0.4 K/UL (ref 0–1)
MONOCYTES NFR BLD: 8 % (ref 5–13)
NEUTS SEG # BLD: 2.4 K/UL (ref 1.8–8)
NEUTS SEG NFR BLD: 47 % (ref 32–75)
NRBC # BLD: 0 K/UL (ref 0–0.01)
NRBC BLD-RTO: 0 PER 100 WBC
PLATELET # BLD AUTO: 372 K/UL (ref 150–400)
PMV BLD AUTO: 9.4 FL (ref 8.9–12.9)
POTASSIUM SERPL-SCNC: 4.7 MMOL/L (ref 3.5–5.1)
PROT SERPL-MCNC: 7.9 G/DL (ref 6.4–8.2)
RBC # BLD AUTO: 4.2 M/UL (ref 3.8–5.2)
SERVICE CMNT-IMP: NORMAL
SODIUM SERPL-SCNC: 141 MMOL/L (ref 136–145)
TRIGL SERPL-MCNC: 242 MG/DL
TSH SERPL DL<=0.05 MIU/L-ACNC: 2.58 UIU/ML (ref 0.36–3.74)
VLDLC SERPL CALC-MCNC: 48.4 MG/DL
WBC # BLD AUTO: 5.1 K/UL (ref 3.6–11)

## 2024-06-06 ENCOUNTER — HOSPITAL ENCOUNTER (OUTPATIENT)
Facility: HOSPITAL | Age: 64
Setting detail: SPECIMEN
Discharge: HOME | End: 2024-06-09

## 2024-06-06 PROCEDURE — 80061 LIPID PANEL: CPT

## 2024-06-06 PROCEDURE — 80053 COMPREHEN METABOLIC PANEL: CPT

## 2024-06-06 PROCEDURE — 36415 COLL VENOUS BLD VENIPUNCTURE: CPT

## 2024-06-07 LAB
ALBUMIN SERPL-MCNC: 4.2 G/DL (ref 3.5–5)
ALBUMIN/GLOB SERPL: 1.2 (ref 1.1–2.2)
ALP SERPL-CCNC: 87 U/L (ref 45–117)
ALT SERPL-CCNC: 25 U/L (ref 12–78)
ANION GAP SERPL CALC-SCNC: 4 MMOL/L (ref 5–15)
AST SERPL-CCNC: 18 U/L (ref 15–37)
BILIRUB SERPL-MCNC: 0.6 MG/DL (ref 0.2–1)
BUN SERPL-MCNC: 14 MG/DL (ref 6–20)
BUN/CREAT SERPL: 23 (ref 12–20)
CALCIUM SERPL-MCNC: 9.7 MG/DL (ref 8.5–10.1)
CHLORIDE SERPL-SCNC: 108 MMOL/L (ref 97–108)
CHOLEST SERPL-MCNC: 247 MG/DL
CO2 SERPL-SCNC: 26 MMOL/L (ref 21–32)
CREAT SERPL-MCNC: 0.62 MG/DL (ref 0.55–1.02)
GLOBULIN SER CALC-MCNC: 3.6 G/DL (ref 2–4)
GLUCOSE SERPL-MCNC: 95 MG/DL (ref 65–100)
HDLC SERPL-MCNC: 67 MG/DL
HDLC SERPL: 3.7 (ref 0–5)
LDLC SERPL CALC-MCNC: 159 MG/DL (ref 0–100)
POTASSIUM SERPL-SCNC: 4.7 MMOL/L (ref 3.5–5.1)
PROT SERPL-MCNC: 7.8 G/DL (ref 6.4–8.2)
SODIUM SERPL-SCNC: 138 MMOL/L (ref 136–145)
TRIGL SERPL-MCNC: 105 MG/DL
VLDLC SERPL CALC-MCNC: 21 MG/DL

## 2024-08-07 ENCOUNTER — OFFICE VISIT (OUTPATIENT)
Age: 64
End: 2024-08-07

## 2024-08-07 ENCOUNTER — HOSPITAL ENCOUNTER (OUTPATIENT)
Facility: HOSPITAL | Age: 64
Discharge: HOME | End: 2024-08-10
Attending: FAMILY MEDICINE

## 2024-08-07 VITALS — BODY MASS INDEX: 24.29 KG/M2 | WEIGHT: 132 LBS | HEIGHT: 62 IN

## 2024-08-07 DIAGNOSIS — Z12.31 BREAST CANCER SCREENING BY MAMMOGRAM: Primary | ICD-10-CM

## 2024-08-07 DIAGNOSIS — Z12.31 VISIT FOR SCREENING MAMMOGRAM: ICD-10-CM

## 2024-08-07 PROCEDURE — 77067 SCR MAMMO BI INCL CAD: CPT

## 2024-08-07 RX ORDER — BISACODYL 5 MG/1
5 TABLET, DELAYED RELEASE ORAL DAILY PRN
COMMUNITY

## 2024-08-07 RX ORDER — ROSUVASTATIN CALCIUM 20 MG/1
20 TABLET, COATED ORAL
COMMUNITY
Start: 2024-07-30

## 2024-08-07 NOTE — PROGRESS NOTES
Apple Klein is a 63 y.o. female   Chief Complaint   Patient presents with    Annual Exam     Every Woman's Life program           ASSESSMENT AND PLAN:    1. Breast cancer screening by mammogram  CBE WNL today; proceed with screening mammogram.  Return annually for screening unless otherwise indicated.    SUBJECTIVE:    HPI:  Apple Klein is a 63 y.o. female who presents to [facility] for breast cancer screening. She denies breast pain, lumps, nipple discharge, nipple inversion and overlying skin changes. No family history of breast, ovarian or colon cancer.    Review of Systems   Constitutional: Negative.    Respiratory: Negative.     Cardiovascular: Negative.    Gastrointestinal: Negative.    Genitourinary:  Negative for dyspareunia, dysuria, genital sores, pelvic pain, vaginal discharge and vaginal pain.     Physical Exam  Constitutional:       General: She is not in acute distress.     Appearance: Normal appearance.   Pulmonary:      Effort: Pulmonary effort is normal.   Chest:   Breasts: Dense breast tissue.     Right: Normal. No inverted nipple, mass, nipple discharge, skin change or tenderness.      Left: Normal. No inverted nipple, mass, nipple discharge, skin change or tenderness.   Lymphadenopathy:      Upper Body:      Right upper body: No supraclavicular, axillary or pectoral adenopathy.      Left upper body: No supraclavicular, axillary or pectoral adenopathy.   Neurological:      Mental Status: She is alert and oriented to person, place, and time.          
her ovary so I am not sure.      Are you taking hormone replacement therapy (HRT)     No Yes Comments   [x]                                  []                                       How many times have you been pregnant?    10       Number of live births ?8    Are you experiencing any of the following?   No Yes Comments   Nipple Discharge [x]                                  []                                     Breast Lump/Masses [x]                                  []                                     Breast Skin Changes [x]                                  []                                          No Yes Comments   Vaginal Discharge [x]                                  []                                     Abnormal/unusual vaginal bleeding [x]                                  []                                         Are you experiencing any other health problems? no        Age at first period?17  Age at first birth?20    Ht--5'2    Wt--132 lbs    I used the  services to assist today. AKILAH BARNES, RN

## 2024-08-29 ENCOUNTER — HOSPITAL ENCOUNTER (OUTPATIENT)
Facility: HOSPITAL | Age: 64
Setting detail: SPECIMEN
Discharge: HOME | End: 2024-09-01

## 2024-08-29 LAB
ALBUMIN SERPL-MCNC: 4.4 G/DL (ref 3.5–5)
ALBUMIN/GLOB SERPL: 1.2 (ref 1.1–2.2)
ALP SERPL-CCNC: 91 U/L (ref 45–117)
ALT SERPL-CCNC: 23 U/L (ref 12–78)
ANION GAP SERPL CALC-SCNC: 4 MMOL/L (ref 5–15)
AST SERPL-CCNC: 16 U/L (ref 15–37)
BILIRUB SERPL-MCNC: 0.5 MG/DL (ref 0.2–1)
BUN SERPL-MCNC: 11 MG/DL (ref 6–20)
BUN/CREAT SERPL: 15 (ref 12–20)
CALCIUM SERPL-MCNC: 9.6 MG/DL (ref 8.5–10.1)
CHLORIDE SERPL-SCNC: 107 MMOL/L (ref 97–108)
CHOLEST SERPL-MCNC: 235 MG/DL
CO2 SERPL-SCNC: 29 MMOL/L (ref 21–32)
CREAT SERPL-MCNC: 0.73 MG/DL (ref 0.55–1.02)
GLOBULIN SER CALC-MCNC: 3.6 G/DL (ref 2–4)
GLUCOSE SERPL-MCNC: 87 MG/DL (ref 65–100)
HDLC SERPL-MCNC: 63 MG/DL
HDLC SERPL: 3.7 (ref 0–5)
LDLC SERPL CALC-MCNC: 136 MG/DL (ref 0–100)
POTASSIUM SERPL-SCNC: 4.6 MMOL/L (ref 3.5–5.1)
PROT SERPL-MCNC: 8 G/DL (ref 6.4–8.2)
SODIUM SERPL-SCNC: 140 MMOL/L (ref 136–145)
SPECIMEN HOLD: NORMAL
TRIGL SERPL-MCNC: 180 MG/DL
VLDLC SERPL CALC-MCNC: 36 MG/DL

## 2024-08-29 PROCEDURE — 87186 SC STD MICRODIL/AGAR DIL: CPT

## 2024-08-29 PROCEDURE — 87086 URINE CULTURE/COLONY COUNT: CPT

## 2024-08-29 PROCEDURE — 87088 URINE BACTERIA CULTURE: CPT

## 2024-08-29 PROCEDURE — 80061 LIPID PANEL: CPT

## 2024-08-29 PROCEDURE — 80053 COMPREHEN METABOLIC PANEL: CPT

## 2024-09-02 LAB
BACTERIA SPEC CULT: ABNORMAL
CC UR VC: ABNORMAL
SERVICE CMNT-IMP: ABNORMAL

## 2025-03-13 ENCOUNTER — HOSPITAL ENCOUNTER (OUTPATIENT)
Age: 65
Discharge: HOME | End: 2025-03-16
Payer: COMMERCIAL

## 2025-03-13 DIAGNOSIS — M25.562 LEFT KNEE PAIN, UNSPECIFIED CHRONICITY: ICD-10-CM

## 2025-03-13 PROCEDURE — 73562 X-RAY EXAM OF KNEE 3: CPT

## 2025-04-01 ENCOUNTER — HOSPITAL ENCOUNTER (OUTPATIENT)
Facility: HOSPITAL | Age: 65
Setting detail: SPECIMEN
Discharge: HOME | End: 2025-04-04

## 2025-04-01 PROCEDURE — 80061 LIPID PANEL: CPT

## 2025-04-02 LAB
CHOLEST SERPL-MCNC: 133 MG/DL
HDLC SERPL-MCNC: 74 MG/DL
HDLC SERPL: 1.8 (ref 0–5)
LDLC SERPL CALC-MCNC: 40.8 MG/DL (ref 0–100)
TRIGL SERPL-MCNC: 91 MG/DL
VLDLC SERPL CALC-MCNC: 18.2 MG/DL

## 2025-07-28 ENCOUNTER — HOSPITAL ENCOUNTER (OUTPATIENT)
Facility: HOSPITAL | Age: 65
Setting detail: SPECIMEN
Discharge: HOME | End: 2025-07-31

## 2025-07-28 PROCEDURE — 80053 COMPREHEN METABOLIC PANEL: CPT

## 2025-07-29 LAB
ALBUMIN SERPL-MCNC: 4.2 G/DL (ref 3.5–5)
ALBUMIN/GLOB SERPL: 1.2 (ref 1.1–2.2)
ALP SERPL-CCNC: 79 U/L (ref 45–117)
ALT SERPL-CCNC: 27 U/L (ref 12–78)
ANION GAP SERPL CALC-SCNC: 6 MMOL/L (ref 2–12)
AST SERPL-CCNC: 20 U/L (ref 15–37)
BILIRUB SERPL-MCNC: 0.3 MG/DL (ref 0.2–1)
BUN SERPL-MCNC: 14 MG/DL (ref 6–20)
BUN/CREAT SERPL: 18 (ref 12–20)
CALCIUM SERPL-MCNC: 9.8 MG/DL (ref 8.5–10.1)
CHLORIDE SERPL-SCNC: 105 MMOL/L (ref 97–108)
CO2 SERPL-SCNC: 28 MMOL/L (ref 21–32)
CREAT SERPL-MCNC: 0.77 MG/DL (ref 0.55–1.02)
GLOBULIN SER CALC-MCNC: 3.6 G/DL (ref 2–4)
GLUCOSE SERPL-MCNC: 105 MG/DL (ref 65–100)
POTASSIUM SERPL-SCNC: 4.2 MMOL/L (ref 3.5–5.1)
PROT SERPL-MCNC: 7.8 G/DL (ref 6.4–8.2)
SODIUM SERPL-SCNC: 139 MMOL/L (ref 136–145)

## 2025-07-30 ENCOUNTER — TELEPHONE (OUTPATIENT)
Age: 65
End: 2025-07-30

## 2025-07-30 NOTE — CONSULTS
Session ID: [id number]  Session Duration: 13 minutes  Language: Cameroonian   ID: #[id number]   Name: Mark

## 2025-07-30 NOTE — TELEPHONE ENCOUNTER
The  services used. Received referral from [facility] for dx breast imaging. Called pt to discuss symptoms. Per referral pt has area of firmness to the left breast at 3 o'clock. Pt states she has had this area for 2 months. Pt has had constant pain with the pain worse at times. Pt denies any redness to skin or warm to touch. Pt denies any nipple disharge. [facility] today- per patient's income, household # and lack of health/medical insurance, pt is eligible for [facility] program. Demographic information up to date in epic. Pt given first available appt. Pt provided address, date and time.Pt verbalized understanding and denies questions. GAUTAM CABALLERO RN

## 2025-09-03 ENCOUNTER — OFFICE VISIT (OUTPATIENT)
Age: 65
End: 2025-09-03

## 2025-09-03 ENCOUNTER — HOSPITAL ENCOUNTER (OUTPATIENT)
Facility: HOSPITAL | Age: 65
Discharge: HOME | End: 2025-09-06
Attending: FAMILY MEDICINE

## 2025-09-03 VITALS — BODY MASS INDEX: 24.29 KG/M2 | HEIGHT: 62 IN | WEIGHT: 132 LBS

## 2025-09-03 DIAGNOSIS — R92.30 BREAST DENSITY: ICD-10-CM

## 2025-09-03 DIAGNOSIS — Z12.31 BREAST CANCER SCREENING BY MAMMOGRAM: Primary | ICD-10-CM

## 2025-09-03 PROCEDURE — 77063 BREAST TOMOSYNTHESIS BI: CPT

## 2025-09-03 RX ORDER — LOSARTAN POTASSIUM 50 MG/1
50 TABLET ORAL DAILY
COMMUNITY

## (undated) DEVICE — SOLIDIFIER MEDC 1200ML -- CONVERT TO 356117

## (undated) DEVICE — KIT COLON DUAL END BRSH W/LUBE -- CUSTOM BX/20 FORMERLY KS-18-20

## (undated) DEVICE — BAG BELONG PT PERS CLEAR HANDL

## (undated) DEVICE — CUFF RMFG BP INF SZ 11 DISP -- LAWSON OEM ITEM 238915

## (undated) DEVICE — FCPS RAD JAW 4LC 240CM W/NDL -- BX/40

## (undated) DEVICE — BLOCK BTE ENDOSCP AD 60FR 20MM -- MAXI BITE LF STRAP

## (undated) DEVICE — SNARE ENDOSCP M L240CM W27MM SHTH DIA2.4MM CHN 2.8MM OVL

## (undated) DEVICE — CANNULA CUSH AD W/ 14FT TBG

## (undated) DEVICE — BLUNTFILL WITH FILTER: Brand: MONOJECT

## (undated) DEVICE — BAG SPEC BIOHZRD 10 X 10 IN --

## (undated) DEVICE — POLYP TRAP: Brand: TRAPEASE®

## (undated) DEVICE — (D)SENSOR RMFG 02 PULS OXMTR -- DISC BY MFR USE ITEM 133445

## (undated) DEVICE — BASIN EMSIS 16OZ GRAPHITE PLAS KID SHP MOLD GRAD FOR ORAL

## (undated) DEVICE — SYR 3ML LL TIP 1/10ML GRAD --

## (undated) DEVICE — 1200 GUARD II KIT W/5MM TUBE W/O VAC TUBE: Brand: GUARDIAN

## (undated) DEVICE — ELECTRODE,RADIOTRANSLUCENT,FOAM,3PK: Brand: MEDLINE

## (undated) DEVICE — Device

## (undated) DEVICE — CONTAINER SPEC 20 ML LID NEUT BUFF FORMALIN 10 % POLYPR STS

## (undated) DEVICE — CATH IV AUTOGRD BC PNK 20GA 25 -- INSYTE